# Patient Record
Sex: FEMALE | Race: WHITE | NOT HISPANIC OR LATINO | Employment: FULL TIME | URBAN - METROPOLITAN AREA
[De-identification: names, ages, dates, MRNs, and addresses within clinical notes are randomized per-mention and may not be internally consistent; named-entity substitution may affect disease eponyms.]

---

## 2022-08-22 NOTE — H&P (VIEW-ONLY)
"  Subjective:     Chief Complaint: Lung Mass Clinic (Lung nodule)      HPI: Cynthia Hanson is a 61 y.o. female who presents to clinic for evaluation of right upper lobe lung mass.      Her right upper lobe lung lesion was initially discovered on lung cancer screening CT dated 08/12/2022.  On the radiologist's interpretation, there was comment regarding these findings being new from last CT chest for lung cancer screening in 2019.  However, these images are not available for review at the time of appointment.     She states that she is a current smoker, current weight 2-3 cigarettes per day.  She was a former heavier smoker, around 1 pack per day, but has not smoked 1 pack per day for approximately 30 years.      Past Medical History:   Diagnosis Date    Cholelithiases     Macular degeneration          14 point ROS reviewed and negative unless documented in the history of present illness.       Social History     Socioeconomic History    Marital status:    Tobacco Use    Smoking status: Current Every Day Smoker     Packs/day: 1.00     Years: 35.00     Pack years: 35.00     Types: Cigarettes    Smokeless tobacco: Never Used    Tobacco comment: Now smoking 1-3 cigarettes per day         Current Outpatient Medications   Medication Instructions    loratadine (CLARITIN) 10 mg, Oral, Daily    omeprazole (PRILOSEC) 20 mg, Oral, Daily         No flowsheet data found.      Objective:   /74   Pulse 80   Temp 98.3 °F (36.8 °C)   Resp 16   Ht 5' 5" (1.651 m)   Wt 59 kg (130 lb)   SpO2 97% Comment: on room air at rest  BMI 21.63 kg/m²     Gen- A/O, NAD  CV- RRR, no murmurs  Resp- CTAB, reduced air entry bilaterally  MSK- WWP, no LE edema  Neuro- A/Ox3, no gross deficits  Psych- appropriate mood and affect        Imaging:  CT chest 08/12/2022 personally reviewed:  Right upper lobe mass measuring approximately 4 x 1.9 cm with pleural tethering and focal areas of bronchiectasis " within      Pulmonary Function Testing:  No pulmonary function testing available for review at time of appointment.        Assessment/Plan:   #Right upper lobe lung mass  #Tobacco abuse     -Ms. Hanson certainly does have risk factors for the development of lung cancer, particularly given her age and tobacco abuse, and the size of her right upper lobe mass is concerning  -however, there are features of her lesion that are suspicious for a non malignant cause, particularly parenchymal scarring--> there are pleural tethers and associated focal areas of bronchiectasis which I suspect may be traction related  -however, given the high risk for malignancy and large size of the lesion, this would benefit from sampling for attempted diagnosis   -will have navigational bronchoscopy performed this week   -should samples return negative, would favor performing PET/CT to document no FDG uptake prior to settling on a management option of surveillance       Return to clinic pending biopsy results        Brett Rosario MD

## 2022-08-23 ENCOUNTER — HOSPITAL ENCOUNTER (OUTPATIENT)
Dept: RADIOLOGY | Facility: HOSPITAL | Age: 62
Discharge: HOME | End: 2022-08-23
Attending: INTERNAL MEDICINE
Payer: COMMERCIAL

## 2022-08-23 DIAGNOSIS — R91.8 LUNG MASS: ICD-10-CM

## 2022-08-23 PROCEDURE — 71250 CT THORAX DX C-: CPT | Mod: TC

## 2022-08-24 ENCOUNTER — ANESTHESIA EVENT (OUTPATIENT)
Dept: ENDOSCOPY | Facility: HOSPITAL | Age: 62
End: 2022-08-24
Payer: COMMERCIAL

## 2022-08-25 ENCOUNTER — HOSPITAL ENCOUNTER (OUTPATIENT)
Facility: HOSPITAL | Age: 62
Discharge: HOME | End: 2022-08-25
Attending: INTERNAL MEDICINE | Admitting: INTERNAL MEDICINE
Payer: COMMERCIAL

## 2022-08-25 ENCOUNTER — ANESTHESIA (OUTPATIENT)
Dept: ENDOSCOPY | Facility: HOSPITAL | Age: 62
End: 2022-08-25
Payer: COMMERCIAL

## 2022-08-25 DIAGNOSIS — R91.1 PULMONARY NODULE: ICD-10-CM

## 2022-08-25 DIAGNOSIS — R91.1 COIN LESION: ICD-10-CM

## 2022-08-25 DIAGNOSIS — R91.8 PULMONARY MASS: Primary | ICD-10-CM

## 2022-08-25 LAB
KOH PREP SPEC: NORMAL

## 2022-08-25 PROCEDURE — 31627 NAVIGATIONAL BRONCHOSCOPY: CPT | Performed by: INTERNAL MEDICINE

## 2022-08-25 PROCEDURE — 87220 TISSUE EXAM FOR FUNGI: CPT | Mod: 91 | Performed by: INTERNAL MEDICINE

## 2022-08-25 PROCEDURE — C1887 CATHETER, GUIDING: HCPCS | Performed by: INTERNAL MEDICINE

## 2022-08-25 PROCEDURE — 99900035 HC TECH TIME PER 15 MIN (STAT)

## 2022-08-25 PROCEDURE — 37000008 HC ANESTHESIA 1ST 15 MINUTES: Performed by: INTERNAL MEDICINE

## 2022-08-25 PROCEDURE — 31623 DX BRONCHOSCOPE/BRUSH: CPT | Performed by: INTERNAL MEDICINE

## 2022-08-25 PROCEDURE — 25000003 PHARM REV CODE 250: Performed by: INTERNAL MEDICINE

## 2022-08-25 PROCEDURE — 87102 FUNGUS ISOLATION CULTURE: CPT | Mod: 91 | Performed by: INTERNAL MEDICINE

## 2022-08-25 PROCEDURE — 31628 BRONCHOSCOPY/LUNG BX EACH: CPT | Performed by: INTERNAL MEDICINE

## 2022-08-25 PROCEDURE — 27201423 OPTIME MED/SURG SUP & DEVICES STERILE SUPPLY: Performed by: INTERNAL MEDICINE

## 2022-08-25 PROCEDURE — 25000003 PHARM REV CODE 250: Performed by: STUDENT IN AN ORGANIZED HEALTH CARE EDUCATION/TRAINING PROGRAM

## 2022-08-25 PROCEDURE — 87206 SMEAR FLUORESCENT/ACID STAI: CPT | Mod: 91 | Performed by: INTERNAL MEDICINE

## 2022-08-25 PROCEDURE — 37000009 HC ANESTHESIA EA ADD 15 MINS: Performed by: INTERNAL MEDICINE

## 2022-08-25 PROCEDURE — 94002 VENT MGMT INPAT INIT DAY: CPT

## 2022-08-25 PROCEDURE — 63600175 PHARM REV CODE 636 W HCPCS: Performed by: STUDENT IN AN ORGANIZED HEALTH CARE EDUCATION/TRAINING PROGRAM

## 2022-08-25 PROCEDURE — 31629 BRONCHOSCOPY/NEEDLE BX EACH: CPT | Performed by: INTERNAL MEDICINE

## 2022-08-25 RX ORDER — METOCLOPRAMIDE HYDROCHLORIDE 5 MG/ML
10 INJECTION INTRAMUSCULAR; INTRAVENOUS EVERY 10 MIN PRN
Status: CANCELLED | OUTPATIENT
Start: 2022-08-25

## 2022-08-25 RX ORDER — DIPHENHYDRAMINE HYDROCHLORIDE 50 MG/ML
25 INJECTION INTRAMUSCULAR; INTRAVENOUS EVERY 6 HOURS PRN
Status: CANCELLED | OUTPATIENT
Start: 2022-08-25

## 2022-08-25 RX ORDER — ONDANSETRON 2 MG/ML
4 INJECTION INTRAMUSCULAR; INTRAVENOUS DAILY PRN
Status: CANCELLED | OUTPATIENT
Start: 2022-08-25

## 2022-08-25 RX ORDER — ROCURONIUM BROMIDE 10 MG/ML
INJECTION, SOLUTION INTRAVENOUS
Status: DISCONTINUED | OUTPATIENT
Start: 2022-08-25 | End: 2022-08-25

## 2022-08-25 RX ORDER — HYDROMORPHONE HYDROCHLORIDE 2 MG/ML
0.4 INJECTION, SOLUTION INTRAMUSCULAR; INTRAVENOUS; SUBCUTANEOUS EVERY 10 MIN PRN
Status: CANCELLED | OUTPATIENT
Start: 2022-08-25

## 2022-08-25 RX ORDER — MEPERIDINE HYDROCHLORIDE 25 MG/ML
12.5 INJECTION INTRAMUSCULAR; INTRAVENOUS; SUBCUTANEOUS EVERY 10 MIN PRN
Status: CANCELLED | OUTPATIENT
Start: 2022-08-25 | End: 2022-08-25

## 2022-08-25 RX ORDER — LIDOCAINE HYDROCHLORIDE 20 MG/ML
15 SOLUTION OROPHARYNGEAL ONCE
Status: COMPLETED | OUTPATIENT
Start: 2022-08-25 | End: 2022-08-25

## 2022-08-25 RX ORDER — LIDOCAINE HYDROCHLORIDE 40 MG/ML
4 SOLUTION TOPICAL
Status: DISCONTINUED | OUTPATIENT
Start: 2022-08-25 | End: 2022-08-25 | Stop reason: HOSPADM

## 2022-08-25 RX ORDER — PROPOFOL 10 MG/ML
VIAL (ML) INTRAVENOUS
Status: DISCONTINUED | OUTPATIENT
Start: 2022-08-25 | End: 2022-08-25

## 2022-08-25 RX ORDER — ONDANSETRON HYDROCHLORIDE 2 MG/ML
INJECTION, SOLUTION INTRAMUSCULAR; INTRAVENOUS
Status: DISCONTINUED | OUTPATIENT
Start: 2022-08-25 | End: 2022-08-25

## 2022-08-25 RX ORDER — DEXAMETHASONE SODIUM PHOSPHATE 4 MG/ML
INJECTION, SOLUTION INTRA-ARTICULAR; INTRALESIONAL; INTRAMUSCULAR; INTRAVENOUS; SOFT TISSUE
Status: DISCONTINUED | OUTPATIENT
Start: 2022-08-25 | End: 2022-08-25

## 2022-08-25 RX ORDER — PHENYLEPHRINE HCL IN 0.9% NACL 1 MG/10 ML
SYRINGE (ML) INTRAVENOUS
Status: DISCONTINUED | OUTPATIENT
Start: 2022-08-25 | End: 2022-08-25

## 2022-08-25 RX ORDER — PROPOFOL 10 MG/ML
VIAL (ML) INTRAVENOUS CONTINUOUS PRN
Status: DISCONTINUED | OUTPATIENT
Start: 2022-08-25 | End: 2022-08-25

## 2022-08-25 RX ORDER — FENTANYL CITRATE 50 UG/ML
INJECTION, SOLUTION INTRAMUSCULAR; INTRAVENOUS
Status: DISCONTINUED | OUTPATIENT
Start: 2022-08-25 | End: 2022-08-25

## 2022-08-25 RX ORDER — MIDAZOLAM HYDROCHLORIDE 1 MG/ML
INJECTION INTRAMUSCULAR; INTRAVENOUS
Status: DISCONTINUED | OUTPATIENT
Start: 2022-08-25 | End: 2022-08-25

## 2022-08-25 RX ADMIN — PROPOFOL 150 MG: 10 INJECTION, EMULSION INTRAVENOUS at 07:08

## 2022-08-25 RX ADMIN — SUGAMMADEX 100 MG: 100 INJECTION, SOLUTION INTRAVENOUS at 08:08

## 2022-08-25 RX ADMIN — LIDOCAINE HYDROCHLORIDE 15 ML: 20 SOLUTION ORAL; TOPICAL at 07:08

## 2022-08-25 RX ADMIN — FENTANYL CITRATE 50 MCG: 50 INJECTION, SOLUTION INTRAMUSCULAR; INTRAVENOUS at 07:08

## 2022-08-25 RX ADMIN — DEXAMETHASONE SODIUM PHOSPHATE 4 MG: 4 INJECTION, SOLUTION INTRA-ARTICULAR; INTRALESIONAL; INTRAMUSCULAR; INTRAVENOUS; SOFT TISSUE at 07:08

## 2022-08-25 RX ADMIN — LIDOCAINE HYDROCHLORIDE 4 ML: 40 SOLUTION TOPICAL at 07:08

## 2022-08-25 RX ADMIN — Medication 100 MCG: at 08:08

## 2022-08-25 RX ADMIN — ONDANSETRON 4 MG: 2 INJECTION INTRAMUSCULAR; INTRAVENOUS at 07:08

## 2022-08-25 RX ADMIN — MIDAZOLAM HYDROCHLORIDE 2 MG: 1 INJECTION, SOLUTION INTRAMUSCULAR; INTRAVENOUS at 07:08

## 2022-08-25 RX ADMIN — SODIUM CHLORIDE, SODIUM GLUCONATE, SODIUM ACETATE, POTASSIUM CHLORIDE AND MAGNESIUM CHLORIDE: 526; 502; 368; 37; 30 INJECTION, SOLUTION INTRAVENOUS at 07:08

## 2022-08-25 RX ADMIN — PROPOFOL 125 MCG/KG/MIN: 10 INJECTION, EMULSION INTRAVENOUS at 08:08

## 2022-08-25 RX ADMIN — ROCURONIUM BROMIDE 50 MG: 10 SOLUTION INTRAVENOUS at 07:08

## 2022-08-25 RX ADMIN — FENTANYL CITRATE 50 MCG: 50 INJECTION, SOLUTION INTRAMUSCULAR; INTRAVENOUS at 08:08

## 2022-08-25 NOTE — PROCEDURES
[facility]  Bronchoscopy   Procedure Note    SUMMARY     Date of Procedure: 8/25/2022    Procedure:  Electromagnetic navigational bronchoscopy    Performed by: Brett Rosario MD    Pre-Procedure Diagnosis:  Pulmonary mass    Post-Procedure Diagnosis:  Same    Anesthesia:  General endotracheal        Description of the Findings of the Procedure:     Patient was consented for the procedure with all risk and benefit of the procedure explained in detail.  Patient was given the opportunity to ask questions and all concerns were answered.  The bronchocope was inserted into the main airway via a size 8.5 endotracheal tube. An anatomical survey was done of the main airways to the level of the subsegmental bronchus with no endobronchial lesions or mucosal abnormalities identified.     The locatable guide was then inserted and electromagnetic navigation was used to locate the lesion within the right upper lobe.  Location was again confirmed with the use of continuous fluoroscopy with sweep.  Sampling of the lesion was then accomplished with 4 passes for fine-needle aspiration, 4 passes for bronchial brushing, and 8 passes of transbronchial biopsy.  All of the samples were taken with the use of continuous fluoroscopy for confirmation of location.  There was no evidence of the development of pneumothorax during the procedure.  Patient tolerated procedure well with minimal visible blood loss.  Patient extubated in the procedure room and will be sent to postanesthesia care for recovery.      Complications: None; patient tolerated the procedure well.    Estimated Blood Loss (EBL): Minimal           Specimens:   Right upper lobe fine-needle aspirate   Right upper lobe bronchial brushing  Right upper lobe transbronchial biopsy       Condition: Good        Disposition:  To PACU in stable condition.  Discharge to home following recovery.        Brett Rosario MD  [facility]

## 2022-08-25 NOTE — ANESTHESIA PROCEDURE NOTES
Intubation    Date/Time: 8/25/2022 8:00 AM  Performed by: Lisa Verduzco  Authorized by: Mohamud Duncan MD     Intubation:     Induction:  Intravenous    Intubated:  Postinduction    Mask Ventilation:  Easy mask    Attempts:  1    Attempted By:  CRNA    Method of Intubation:  Direct    Blade:  Cunningham 2    Laryngeal View Grade: Grade IIA - cords partially seen      Difficult Airway Encountered?: No      Complications:  None    Airway Device:  Oral endotracheal tube    Airway Device Size:  8.5    Style/Cuff Inflation:  Cuffed (inflated to minimal occlusive pressure)    Inflation Amount (mL):  4    Tube secured:  21    Secured at:  The lips    Placement Verified By:  Capnometry    Complicating Factors:  None    Findings Post-Intubation:  BS equal bilateral

## 2022-08-25 NOTE — DISCHARGE INSTRUCTIONS
NO driving and NO alcohol consumption for 24 hours and while taking narcotic pain medication.     Mild cough and/or sore throat is expected. Red streaks may be present in mucus. Hard candies, peppermints, throat lozenges, gargling with warm water and salt may help     Although no incisions were made monitor for signs of infection such as fever or chills.      Report to your nearest ER and/or call your doctor if you experience any SUDDEN/SEVERE chest/abdominal pain, weakness, trouble breathing, uncontrolled pain, coughing up more than 8oz of blood.

## 2022-08-25 NOTE — INTERVAL H&P NOTE
The patient has been examined and the H&P has been reviewed:    I concur with the findings and no changes have occurred since H&P was written.    Surgery risks, benefits and alternative options discussed and understood by patient/family.      Electromagnetic navigation bronchoscopy today.           There are no hospital problems to display for this patient.

## 2022-08-25 NOTE — ANESTHESIA PREPROCEDURE EVALUATION
08/25/2022  Cynthia Hanson is a 61 y.o., female.      Pre-op Assessment    I have reviewed the Patient Summary Reports.     I have reviewed the Nursing Notes. I have reviewed the NPO Status.   I have reviewed the Medications.     Review of Systems  Anesthesia Hx:   Denies Personal Hx of Anesthesia complications.   Social:  Smoker    Hematology/Oncology:  Hematology Normal      Oncology Comments: Upper lobe lesion    EENT/Dental:EENT/Dental Normal   Cardiovascular:  Cardiovascular Normal     Hepatic/GI:   GERD    Musculoskeletal:  Musculoskeletal Normal    Neurological:  Neurology Normal    Endocrine:  Endocrine Normal    Psych:  Psychiatric Normal           Physical Exam  General: Well nourished, Cooperative and Alert    Airway:  Mallampati: II   Mouth Opening: Normal  TM Distance: 4 - 6 cm  Tongue: Normal  Neck ROM: Normal ROM    Dental:  Dentures    Chest/Lungs:  Normal Respiratory Rate    Heart:  Rhythm: Regular Rhythm        Anesthesia Plan  Type of Anesthesia, risks & benefits discussed:    Anesthesia Type: Gen ETT  Intra-op Monitoring Plan: Standard ASA Monitors  Induction:  IV  Airway Plan: , Post-Induction  Informed Consent: Informed consent signed with the Patient and all parties understand the risks and agree with anesthesia plan.  All questions answered.   ASA Score: 2  Day of Surgery Review of History & Physical: H&P Update referred to the surgeon/provider.    Ready For Surgery From Anesthesia Perspective.     .

## 2022-08-25 NOTE — TRANSFER OF CARE
"Anesthesia Transfer of Care Note    Patient: Cynthia Hanson    Procedure(s) Performed: Procedure(s) (LRB):  BRONCHOSCOPY, NAVIGATIONAL (N/A)    Patient location: PACU    Anesthesia Type: general    Transport from OR: Transported from OR on room air with adequate spontaneous ventilation    Post pain: adequate analgesia    Post assessment: no apparent anesthetic complications and tolerated procedure well    Post vital signs: stable    Level of consciousness: awake, alert and oriented    Nausea/Vomiting: no nausea/vomiting    Complications: none    Transfer of care protocol was followed      Last vitals:   Visit Vitals  BP (!) 99/58   Pulse 69   Temp 36 °C (96.8 °F)   Resp 16   Ht 5' 5" (1.651 m)   Wt 59.6 kg (131 lb 6.3 oz)   SpO2 99%   Breastfeeding No   BMI 21.87 kg/m²     "

## 2022-08-26 LAB
M AVIUM PARATB TISS QL ZN STN: NORMAL
RHODAMINE-AURAMINE STN SPEC: NORMAL
RHODAMINE-AURAMINE STN SPEC: NORMAL

## 2022-08-26 NOTE — ANESTHESIA POSTPROCEDURE EVALUATION
Anesthesia Post Evaluation    Patient: Cynthia Hanson    Procedure(s) Performed: Procedure(s) (LRB):  BRONCHOSCOPY, NAVIGATIONAL (N/A)  BRONCHOSCOPY, W TRANSBRONCHIAL NEEDLE BIOPSY THROUGH TRACHEA, MAINSTEM BRONCHUS, LOBAR BRONCHUS, OR ANY COMBINATION, W FLUORO GUID IF INDIC  BRONCHOSCOPY, WITH TRANSBRONCHIAL BIOPSY  BRONCHOSCOPY, WITH BRUSH BIOPSY    Final Anesthesia Type: general      Patient location during evaluation: PACU  Patient participation: Yes- Able to Participate  Level of consciousness: awake and alert  Post-procedure vital signs: reviewed and stable  Pain management: adequate  Airway patency: patent    PONV status at discharge: No PONV  Anesthetic complications: no      Respiratory status: unassisted  Hydration status: euvolemic  Follow-up not needed.          Vitals Value Taken Time   /68 08/25/22 1027   Temp 36 °C (96.8 °F) 08/25/22 0921   Pulse 72 08/25/22 1028   Resp 15 08/25/22 0940   SpO2 92 % 08/25/22 1028         Event Time   Out of Recovery 09:40:00         Pain/Rei Score: Rei Score: 10 (8/25/2022  9:44 AM)

## 2022-08-29 VITALS
DIASTOLIC BLOOD PRESSURE: 68 MMHG | OXYGEN SATURATION: 92 % | WEIGHT: 131.38 LBS | RESPIRATION RATE: 15 BRPM | HEART RATE: 72 BPM | BODY MASS INDEX: 21.89 KG/M2 | TEMPERATURE: 97 F | SYSTOLIC BLOOD PRESSURE: 101 MMHG | HEIGHT: 65 IN

## 2022-08-29 LAB
DHEA SERPL-MCNC: NORMAL
ESTROGEN SERPL-MCNC: NORMAL PG/ML
INSULIN SERPL-ACNC: NORMAL U[IU]/ML
LAB AP CLINICAL INFORMATION: NORMAL
LAB AP GROSS DESCRIPTION: NORMAL
LAB AP INTRA OP: NORMAL
LAB AP REPORT FOOTNOTES: NORMAL
T3RU NFR SERPL: NORMAL %

## 2022-09-15 ENCOUNTER — HOSPITAL ENCOUNTER (OUTPATIENT)
Dept: RADIOLOGY | Facility: HOSPITAL | Age: 62
Discharge: HOME | End: 2022-09-15
Attending: INTERNAL MEDICINE
Payer: COMMERCIAL

## 2022-09-15 DIAGNOSIS — R91.8 PULMONARY MASS: ICD-10-CM

## 2022-09-15 DIAGNOSIS — R91.1 LUNG NODULE: ICD-10-CM

## 2022-09-15 PROCEDURE — 71045 X-RAY EXAM CHEST 1 VIEW: CPT | Mod: TC

## 2022-09-15 PROCEDURE — 78815 PET IMAGE W/CT SKULL-THIGH: CPT | Mod: TC

## 2022-09-26 LAB
FUNGUS SPEC CULT: NORMAL
FUNGUS SPEC CULT: NORMAL

## 2022-10-07 ENCOUNTER — HOSPITAL ENCOUNTER (OUTPATIENT)
Dept: RADIOLOGY | Facility: HOSPITAL | Age: 62
Discharge: HOME | DRG: 200 | End: 2022-10-07
Attending: INTERNAL MEDICINE
Payer: COMMERCIAL

## 2022-10-07 ENCOUNTER — HOSPITAL ENCOUNTER (OUTPATIENT)
Dept: RADIOLOGY | Facility: HOSPITAL | Age: 62
Discharge: HOME | DRG: 200 | End: 2022-10-07
Attending: RADIOLOGY
Payer: COMMERCIAL

## 2022-10-07 ENCOUNTER — HOSPITAL ENCOUNTER (INPATIENT)
Dept: RADIOLOGY | Facility: HOSPITAL | Age: 62
LOS: 3 days | Discharge: HOME | DRG: 200 | End: 2022-10-10
Attending: INTERNAL MEDICINE | Admitting: RADIOLOGY
Payer: COMMERCIAL

## 2022-10-07 VITALS
DIASTOLIC BLOOD PRESSURE: 60 MMHG | HEART RATE: 67 BPM | OXYGEN SATURATION: 99 % | RESPIRATION RATE: 11 BRPM | SYSTOLIC BLOOD PRESSURE: 114 MMHG

## 2022-10-07 DIAGNOSIS — J93.9 PNEUMOTHORAX, UNSPECIFIED TYPE: ICD-10-CM

## 2022-10-07 DIAGNOSIS — R91.1 LUNG NODULE: ICD-10-CM

## 2022-10-07 DIAGNOSIS — R91.8 LUNG MASS: Primary | ICD-10-CM

## 2022-10-07 LAB
ALBUMIN SERPL-MCNC: 3.6 GM/DL (ref 3.4–4.8)
ALBUMIN/GLOB SERPL: 1.3 RATIO (ref 1.1–2)
ALP SERPL-CCNC: 52 UNIT/L (ref 40–150)
ALT SERPL-CCNC: 14 UNIT/L (ref 0–55)
AST SERPL-CCNC: 16 UNIT/L (ref 5–34)
BASOPHILS # BLD AUTO: 0.07 X10(3)/MCL (ref 0–0.2)
BASOPHILS NFR BLD AUTO: 0.9 %
BILIRUBIN DIRECT+TOT PNL SERPL-MCNC: 0.5 MG/DL
BUN SERPL-MCNC: 13.4 MG/DL (ref 9.8–20.1)
CALCIUM SERPL-MCNC: 8.6 MG/DL (ref 8.4–10.2)
CHLORIDE SERPL-SCNC: 105 MMOL/L (ref 98–107)
CO2 SERPL-SCNC: 28 MMOL/L (ref 23–31)
CREAT SERPL-MCNC: 0.65 MG/DL (ref 0.55–1.02)
EOSINOPHIL # BLD AUTO: 0.2 X10(3)/MCL (ref 0–0.9)
EOSINOPHIL NFR BLD AUTO: 2.6 %
ERYTHROCYTE [DISTWIDTH] IN BLOOD BY AUTOMATED COUNT: 12.9 % (ref 11.5–17)
GFR SERPLBLD CREATININE-BSD FMLA CKD-EPI: >60 MLS/MIN/1.73/M2
GLOBULIN SER-MCNC: 2.7 GM/DL (ref 2.4–3.5)
GLUCOSE SERPL-MCNC: 116 MG/DL (ref 82–115)
HCT VFR BLD AUTO: 43.4 % (ref 37–47)
HGB BLD-MCNC: 14.4 GM/DL (ref 12–16)
IMM GRANULOCYTES # BLD AUTO: 0.02 X10(3)/MCL (ref 0–0.04)
IMM GRANULOCYTES NFR BLD AUTO: 0.3 %
INR BLD: 0.89 (ref 0–1.3)
LYMPHOCYTES # BLD AUTO: 1.7 X10(3)/MCL (ref 0.6–4.6)
LYMPHOCYTES NFR BLD AUTO: 22.3 %
MCH RBC QN AUTO: 30.8 PG (ref 27–31)
MCHC RBC AUTO-ENTMCNC: 33.2 MG/DL (ref 33–36)
MCV RBC AUTO: 92.7 FL (ref 80–94)
MONOCYTES # BLD AUTO: 0.7 X10(3)/MCL (ref 0.1–1.3)
MONOCYTES NFR BLD AUTO: 9.2 %
NEUTROPHILS # BLD AUTO: 4.9 X10(3)/MCL (ref 2.1–9.2)
NEUTROPHILS NFR BLD AUTO: 64.7 %
NRBC BLD AUTO-RTO: 0 %
PLATELET # BLD AUTO: 242 X10(3)/MCL (ref 130–400)
PMV BLD AUTO: 8.7 FL (ref 7.4–10.4)
POTASSIUM SERPL-SCNC: 3.9 MMOL/L (ref 3.5–5.1)
PROT SERPL-MCNC: 6.3 GM/DL (ref 5.8–7.6)
PROTHROMBIN TIME: 12 SECONDS (ref 12.5–14.5)
RBC # BLD AUTO: 4.68 X10(6)/MCL (ref 4.2–5.4)
SODIUM SERPL-SCNC: 139 MMOL/L (ref 136–145)
WBC # SPEC AUTO: 7.6 X10(3)/MCL (ref 4.5–11.5)

## 2022-10-07 PROCEDURE — 99152 MOD SED SAME PHYS/QHP 5/>YRS: CPT | Performed by: RADIOLOGY

## 2022-10-07 PROCEDURE — 75989 ABSCESS DRAINAGE UNDER X-RAY: CPT | Mod: TC

## 2022-10-07 PROCEDURE — 63600175 PHARM REV CODE 636 W HCPCS: Performed by: NURSE PRACTITIONER

## 2022-10-07 PROCEDURE — 25000003 PHARM REV CODE 250: Performed by: NURSE PRACTITIONER

## 2022-10-07 PROCEDURE — 36415 COLL VENOUS BLD VENIPUNCTURE: CPT | Performed by: RADIOLOGY

## 2022-10-07 PROCEDURE — 63600175 PHARM REV CODE 636 W HCPCS: Performed by: RADIOLOGY

## 2022-10-07 PROCEDURE — 71045 X-RAY EXAM CHEST 1 VIEW: CPT | Mod: TC

## 2022-10-07 PROCEDURE — 32408 CORE NDL BX LNG/MED PERQ: CPT

## 2022-10-07 PROCEDURE — 25000003 PHARM REV CODE 250: Performed by: RADIOLOGY

## 2022-10-07 PROCEDURE — 80053 COMPREHEN METABOLIC PANEL: CPT | Performed by: NURSE PRACTITIONER

## 2022-10-07 PROCEDURE — 11000001 HC ACUTE MED/SURG PRIVATE ROOM

## 2022-10-07 PROCEDURE — 36415 COLL VENOUS BLD VENIPUNCTURE: CPT | Performed by: NURSE PRACTITIONER

## 2022-10-07 PROCEDURE — 85610 PROTHROMBIN TIME: CPT | Performed by: RADIOLOGY

## 2022-10-07 PROCEDURE — 85025 COMPLETE CBC W/AUTO DIFF WBC: CPT | Performed by: RADIOLOGY

## 2022-10-07 RX ORDER — KETOROLAC TROMETHAMINE 30 MG/ML
30 INJECTION, SOLUTION INTRAMUSCULAR; INTRAVENOUS ONCE
Status: COMPLETED | OUTPATIENT
Start: 2022-10-07 | End: 2022-10-07

## 2022-10-07 RX ORDER — IBUPROFEN 200 MG
24 TABLET ORAL
Status: DISCONTINUED | OUTPATIENT
Start: 2022-10-07 | End: 2022-10-10 | Stop reason: HOSPADM

## 2022-10-07 RX ORDER — MIDAZOLAM HYDROCHLORIDE 1 MG/ML
INJECTION INTRAMUSCULAR; INTRAVENOUS
Status: COMPLETED | OUTPATIENT
Start: 2022-10-07 | End: 2022-10-07

## 2022-10-07 RX ORDER — MIDAZOLAM HYDROCHLORIDE 1 MG/ML
INJECTION INTRAMUSCULAR; INTRAVENOUS
Status: DISCONTINUED
Start: 2022-10-07 | End: 2022-10-08 | Stop reason: HOSPADM

## 2022-10-07 RX ORDER — LIDOCAINE HYDROCHLORIDE 20 MG/ML
INJECTION, SOLUTION EPIDURAL; INFILTRATION; INTRACAUDAL; PERINEURAL
Status: DISPENSED
Start: 2022-10-07 | End: 2022-10-07

## 2022-10-07 RX ORDER — FENTANYL CITRATE 50 UG/ML
INJECTION, SOLUTION INTRAMUSCULAR; INTRAVENOUS
Status: COMPLETED | OUTPATIENT
Start: 2022-10-07 | End: 2022-10-07

## 2022-10-07 RX ORDER — FENTANYL CITRATE 50 UG/ML
INJECTION, SOLUTION INTRAMUSCULAR; INTRAVENOUS
Status: DISCONTINUED
Start: 2022-10-07 | End: 2022-10-08 | Stop reason: HOSPADM

## 2022-10-07 RX ORDER — MIDAZOLAM HYDROCHLORIDE 1 MG/ML
INJECTION INTRAMUSCULAR; INTRAVENOUS
Status: DISPENSED
Start: 2022-10-07 | End: 2022-10-07

## 2022-10-07 RX ORDER — ACETAMINOPHEN 325 MG/1
650 TABLET ORAL EVERY 8 HOURS PRN
Status: DISCONTINUED | OUTPATIENT
Start: 2022-10-07 | End: 2022-10-10 | Stop reason: HOSPADM

## 2022-10-07 RX ORDER — LIDOCAINE HYDROCHLORIDE 20 MG/ML
INJECTION, SOLUTION INFILTRATION; PERINEURAL
Status: COMPLETED | OUTPATIENT
Start: 2022-10-07 | End: 2022-10-07

## 2022-10-07 RX ORDER — HYDROCODONE BITARTRATE AND ACETAMINOPHEN 5; 325 MG/1; MG/1
1 TABLET ORAL EVERY 4 HOURS PRN
Status: DISCONTINUED | OUTPATIENT
Start: 2022-10-07 | End: 2022-10-10 | Stop reason: HOSPADM

## 2022-10-07 RX ORDER — ONDANSETRON 2 MG/ML
4 INJECTION INTRAMUSCULAR; INTRAVENOUS EVERY 8 HOURS PRN
Status: DISCONTINUED | OUTPATIENT
Start: 2022-10-07 | End: 2022-10-10 | Stop reason: HOSPADM

## 2022-10-07 RX ORDER — IBUPROFEN 200 MG
16 TABLET ORAL
Status: DISCONTINUED | OUTPATIENT
Start: 2022-10-07 | End: 2022-10-10 | Stop reason: HOSPADM

## 2022-10-07 RX ORDER — ACETAMINOPHEN 325 MG/1
650 TABLET ORAL EVERY 4 HOURS PRN
Status: DISCONTINUED | OUTPATIENT
Start: 2022-10-07 | End: 2022-10-10 | Stop reason: HOSPADM

## 2022-10-07 RX ORDER — FENTANYL CITRATE 50 UG/ML
INJECTION, SOLUTION INTRAMUSCULAR; INTRAVENOUS
Status: DISPENSED
Start: 2022-10-07 | End: 2022-10-07

## 2022-10-07 RX ORDER — LIDOCAINE HYDROCHLORIDE 20 MG/ML
INJECTION, SOLUTION EPIDURAL; INFILTRATION; INTRACAUDAL; PERINEURAL
Status: DISCONTINUED
Start: 2022-10-07 | End: 2022-10-08 | Stop reason: HOSPADM

## 2022-10-07 RX ORDER — MORPHINE SULFATE 4 MG/ML
4 INJECTION, SOLUTION INTRAMUSCULAR; INTRAVENOUS EVERY 4 HOURS PRN
Status: DISCONTINUED | OUTPATIENT
Start: 2022-10-07 | End: 2022-10-10 | Stop reason: HOSPADM

## 2022-10-07 RX ORDER — GLUCAGON 1 MG
1 KIT INJECTION
Status: DISCONTINUED | OUTPATIENT
Start: 2022-10-07 | End: 2022-10-10 | Stop reason: HOSPADM

## 2022-10-07 RX ADMIN — KETOROLAC TROMETHAMINE 30 MG: 30 INJECTION, SOLUTION INTRAMUSCULAR at 09:10

## 2022-10-07 RX ADMIN — HYDROCODONE BITARTRATE AND ACETAMINOPHEN 1 TABLET: 5; 325 TABLET ORAL at 08:10

## 2022-10-07 RX ADMIN — LIDOCAINE HYDROCHLORIDE 5 ML: 20 INJECTION, SOLUTION INFILTRATION; PERINEURAL at 02:10

## 2022-10-07 RX ADMIN — MIDAZOLAM 0.5 MG: 1 INJECTION INTRAMUSCULAR; INTRAVENOUS at 02:10

## 2022-10-07 RX ADMIN — SODIUM CHLORIDE 250 ML: 9 INJECTION, SOLUTION INTRAVENOUS at 09:10

## 2022-10-07 RX ADMIN — FENTANYL CITRATE 25 MCG: 50 INJECTION, SOLUTION INTRAMUSCULAR; INTRAVENOUS at 10:10

## 2022-10-07 RX ADMIN — FENTANYL CITRATE 50 MCG: 50 INJECTION, SOLUTION INTRAMUSCULAR; INTRAVENOUS at 02:10

## 2022-10-07 RX ADMIN — LIDOCAINE HYDROCHLORIDE 5 ML: 20 INJECTION, SOLUTION INFILTRATION; PERINEURAL at 10:10

## 2022-10-07 RX ADMIN — MIDAZOLAM 1 MG: 1 INJECTION INTRAMUSCULAR; INTRAVENOUS at 10:10

## 2022-10-07 NOTE — H&P
[facility]   H&P ADMISSION NOTE      Patient Name: Cynthia Hanson  MRN: [id number]  Patient Class: IP- Inpatient   Admission Date: 10/7/2022   Admitting Physician:  Service   Attending Physician: Jayesh Garza MD  Primary Care Provider: Silvana Maciel MD  Face-to-Face encounter date: 10/07/2022        CHIEF COMPLAINT   No chief complaint on file.      HISTORY OF PRESENTING ILLNESS   61-year-old female with no past medical history.  She was found to have a right upper lobe spiculated mass on routine screening.  She has a extensive smoking history however has cut back significantly to only a few cigarettes per day.  She was sent to outpatient pulmonary clinic and PET scan noted hypermetabolic lesion and thus initially bronchoscopy was performed however unsuccessful.  She was sent for IR guided biopsy today and during the biopsy was complicated by small pneumothorax subsequently requiring a small pigtail catheter placement.  We have been asked to admit and continue monitoring of this pneumothorax.  At this time patient denies having any complaints besides just soreness all over.  Otherwise she is relatively healthy and active at baseline.        PAST MEDICAL HISTORY     Past Medical History:   Diagnosis Date    Cholelithiases     Lung mass     Macular degeneration        PAST SURGICAL HISTORY     Past Surgical History:   Procedure Laterality Date     SECTION N/A     NAVIGATIONAL BRONCHOSCOPY N/A 2022    Procedure: BRONCHOSCOPY, NAVIGATIONAL;  Surgeon: Brett Rosario MD;  [facility]: [facility] BRONCHOSCOPY LAB;  Service: Pulmonary;  Laterality: N/A;    TUBAL LIGATION         FAMILY HISTORY   Reviewed and noncontributory to this case    SOCIAL HISTORY     Social History     Socioeconomic History    Marital status:    Tobacco Use    Smoking status: Every Day     Packs/day: 1.00     Years: 35.00     Pack years: 35.00     Types: Cigarettes     Smokeless tobacco: Never    Tobacco comments:     Now smoking 1-3 cigarettes per day   Substance and Sexual Activity    Alcohol use: Yes     Alcohol/week: 6.0 standard drinks     Types: 6 Cans of beer per week    Drug use: Not Currently    Sexual activity: Yes       HOME MEDICATIONS     Prior to Admission medications    Medication Sig Start Date End Date Taking? Authorizing Provider   loratadine (CLARITIN) 10 mg tablet Take 10 mg by mouth once daily.   Yes Historical Provider   omeprazole (PRILOSEC) 20 MG capsule Take 20 mg by mouth once daily.   Yes Historical Provider       ALLERGIES   Chlorine and Penicillins          REVIEW OF SYSTEMS   Except as documented above, all other systems reviewed and negative    PHYSICAL EXAM     Vitals:    10/07/22 1500   BP: 113/78   Pulse: 69   Resp: 16   Temp:       General:  In no acute distress, resting comfortably  Head and neck:  Atraumatic, normocephalic, moist mucous membranes, supple neck  Chest:  Slight diminished breath sounds to the right chest, right small pigtail catheter  Heart:  S1, S2, no appreciable murmur  Abdomen:  Soft, nontender, BS +  MSK:  Warm, no lower extremity edema, no clubbing or cyanosis  Neuro:  Alert and oriented x4, moving all extremities with good strength  Integumentary:  No obvious skin rash  Psychiatry:  Appropriate mood and affect          ASSESSMENT AND PLAN   Iatrogenic right-sided pneumothorax-status post small bore pigtail catheter   Suspected primary lung cancer     History of smoking       Pulmonary consulted for right pigtail catheter management.  Keep to wall suction for now.    Repeat chest x-ray in the morning.    Likely will need CV surgery evaluation for resection/lobectomy   Pending pathology for official report.  Preliminary while during biopsy was malignancy.  This was relayed to the patient and she is aware as she was expecting this already    DVT prophylaxis:     __________________________________________________________________  LABS/MICRO/MEDS/DIAGNOSTICS       LABS  No results for input(s): NA, K, CHLORIDE, CO2, BUN, CREATININE, GLUCOSE, CALCIUM, ALKPHOS, AST, ALT, ALBUMIN in the last 72 hours.  Recent Labs     10/07/22  0622   WBC 7.6   RBC 4.68   HCT 43.4   MCV 92.7          MICROBIOLOGY  Microbiology Results (last 7 days)       ** No results found for the last 168 hours. **            MEDICATIONS   fentaNYL        LIDOcaine (PF) 20 mg/mL (2%)        midazolam          INFUSIONS      DIAGNOSTIC TESTS  X-Ray Chest 1 View   Final Result      Placement of right-sided pleural catheter with decreasing size of the pneumothorax.         Electronically signed by: Eve Fonseca   Date:    10/07/2022   Time:    15:19      CT Guided Abscess Drainage With Catheter   Final Result      Successful placement of a 8 Austrian drain into the right pneumothorax.         Electronically signed by: Alen Spivey   Date:    10/07/2022   Time:    14:57      X-Ray Chest 1 View   Final Result      Right-sided pneumothorax has increased in size.         Electronically signed by: Alen Spivey   Date:    10/07/2022   Time:    13:42      X-Ray Chest 1 View   Final Result      Stable right upper lobe lung nodule and right-sided pneumothorax         Electronically signed by: Jaylene Aldana   Date:    10/07/2022   Time:    11:53      X-Ray Chest 1 View   Final Result      No visible pneumothorax.         Electronically signed by: Eve Fonseca   Date:    10/07/2022   Time:    10:55      CT Biopsy Lung w/ guidance   Final Result      CT-guided right upper lobe biopsy as described above.  Small surrounding hematoma as expected.  Small pneumothorax will be followed for possible chest tube placement.         Electronically signed by: Alen Spivey   Date:    10/07/2022   Time:    11:05             Patient information was obtained from patient, patient's family, past medical records and  ER records.   All diagnosis and differential diagnosis have been reviewed; assessment and plan has been documented. I have personally reviewed the labs and test results that are presently available; I have reviewed the patients medication list. I have reviewed the consulting providers response and recommendations. I have reviewed or attempted to review medical records based upon their availability.  All of the patient's questions have been addressed and answered. Patient's is agreeable to the above stated plan. I will continue to monitor closely and make adjustments to medical management as needed.  This document was created using M*Modal Fluency Direct.  Transcription errors may have been made.  Please contact me if any questions may rise regarding documentation to clarify verbiage.        Jayesh Garza MD   10/07/2022   Internal Medicine

## 2022-10-07 NOTE — H&P
Radiology History & Physical      SUBJECTIVE:     Chief Complaint: Right Lung Nodule    History of Present Illness:  Cynthia Hanson is a 61 y.o. female who presents for Biopsy  Past Medical History:   Diagnosis Date    Cholelithiases     Lung mass     Macular degeneration      Past Surgical History:   Procedure Laterality Date     SECTION N/A     NAVIGATIONAL BRONCHOSCOPY N/A 2022    Procedure: BRONCHOSCOPY, NAVIGATIONAL;  Surgeon: Brett oRsario MD;  Location: [facility] BRONCHOSCOPY LAB;  Service: Pulmonary;  Laterality: N/A;    TUBAL LIGATION         Home Meds:   Prior to Admission medications    Medication Sig Start Date End Date Taking? Authorizing Provider   loratadine (CLARITIN) 10 mg tablet Take 10 mg by mouth once daily.   Yes Historical Provider   omeprazole (PRILOSEC) 20 MG capsule Take 20 mg by mouth once daily.   Yes Historical Provider     Anticoagulants/Antiplatelets: no anticoagulation    Allergies:   Review of patient's allergies indicates:   Allergen Reactions    Chlorine     Penicillins Rash     Sedation History:  no adverse reactions    Review of Systems:   Hematological: no known coagulopathies  Respiratory: no shortness of breath  Cardiovascular: no chest pain  Gastrointestinal: no abdominal pain  Genito-Urinary: no dysuria  Musculoskeletal: negative  Neurological: no TIA or stroke symptoms         OBJECTIVE:     Vital Signs (Most Recent)  Temp: 98 °F (36.7 °C) (10/07/22 0632)  Pulse: 76 (10/07/22 0632)  Resp: 18 (10/07/22 0632)  BP: 102/66 (10/07/22 0632)  SpO2: 96 % (10/07/22 0632)    Physical Exam:  ASA: 2    General: no acute distress  Mental Status: alert and oriented to person, place and time  HEENT: normocephalic, atraumatic  Chest: unlabored breathing  Heart: regular heart rate  Abdomen: nondistended  Extremity: moves all extremities    Laboratory  Lab Results   Component Value Date    INR 0.89 10/07/2022       Lab Results   Component Value Date    WBC 7.6  10/07/2022    HGB 14.4 10/07/2022    HCT 43.4 10/07/2022    MCV 92.7 10/07/2022     10/07/2022    No results found for: GLU, NA, K, CL, CO2, BUN, CREATININE, CALCIUM, MG, ALT, AST, ALBUMIN, BILITOT, BILIDIR    ASSESSMENT/PLAN:     Sedation Plan: Moderate  Patient will undergo R Lung Biopsy.    Alen Spivey MD

## 2022-10-07 NOTE — DISCHARGE SUMMARY
Radiology Post-Procedure Note    Pre Op Diagnosis: Right lung mass    Post Op Diagnosis: Right lung mass    Procedure: right lung biopsy    Procedure performed by: Alen Spivey M.D.    Written Informed Consent Obtained: Yes    Specimen Removed: YES     Estimated Blood Loss: Minimal    Findings:   Standard Lung Biopsy. Small post op pneumothorax will be followed.     Patient tolerated procedure well.    Alen Spivey MD

## 2022-10-07 NOTE — OP NOTE
Radiology Post-Procedure Note    Pre Op Diagnosis: Pneumothorax    Post Op Diagnosis:  Chest tube in place    Procedure:  Chest tube placement    Procedure performed by: Alen Spivey M.D.    Written Informed Consent Obtained: Yes    Specimen Removed: YES    Estimated Blood Loss: Minimal    Findings:   Standard Chest tube placement    Patient tolerated procedure well.    Alen Spivey MD

## 2022-10-07 NOTE — SEDATION DOCUMENTATION
Dr Spivey spoke with both pt and spouse, both agree with proceeding with biopsy. Pt returned to CT room at this time.

## 2022-10-07 NOTE — SEDATION DOCUMENTATION
Post-procedure scan, small pneumothorax noted per MD. Pt placed on right side and high flow O2 @ 15L. NAD noted. VSS. Will monitor and rescan in 10 minutes.

## 2022-10-07 NOTE — SEDATION DOCUMENTATION
Dr Spivey request to speak with both pt ans spouse regarding procedure due to high risk. Unable to locate/phone/speak with spouse, [facility] able to speak with both pt and spouse.

## 2022-10-07 NOTE — H&P
Radiology History & Physical      SUBJECTIVE:     Chief Complaint: Pneumothorax    History of Present Illness:  Cynthia Hanson is a 61 y.o. female who presents for Chest tube placement  Past Medical History:   Diagnosis Date    Cholelithiases     Lung mass     Macular degeneration      Past Surgical History:   Procedure Laterality Date     SECTION N/A     NAVIGATIONAL BRONCHOSCOPY N/A 2022    Procedure: BRONCHOSCOPY, NAVIGATIONAL;  Surgeon: Brett Rosario MD;  Location: [facility] BRONCHOSCOPY LAB;  Service: Pulmonary;  Laterality: N/A;    TUBAL LIGATION         Home Meds:   Prior to Admission medications    Medication Sig Start Date End Date Taking? Authorizing Provider   loratadine (CLARITIN) 10 mg tablet Take 10 mg by mouth once daily.    Historical Provider   omeprazole (PRILOSEC) 20 MG capsule Take 20 mg by mouth once daily.    Historical Provider     Anticoagulants/Antiplatelets: no anticoagulation    Allergies:   Review of patient's allergies indicates:   Allergen Reactions    Chlorine     Penicillins Rash     Sedation History:  no adverse reactions    Review of Systems:   Hematological: no known coagulopathies  Respiratory: no shortness of breath  Cardiovascular: no chest pain  Gastrointestinal: no abdominal pain  Genito-Urinary: no dysuria  Musculoskeletal: negative  Neurological: no TIA or stroke symptoms         OBJECTIVE:     Vital Signs (Most Recent)       Physical Exam:  ASA: 2    General: no acute distress  Mental Status: alert and oriented to person, place and time  HEENT: normocephalic, atraumatic  Chest: unlabored breathing  Heart: regular heart rate  Abdomen: nondistended  Extremity: moves all extremities    Laboratory  Lab Results   Component Value Date    INR 0.89 10/07/2022       Lab Results   Component Value Date    WBC 7.6 10/07/2022    HGB 14.4 10/07/2022    HCT 43.4 10/07/2022    MCV 92.7 10/07/2022     10/07/2022    No results found for: GLU, NA, K, CL, CO2,  BUN, CREATININE, CALCIUM, MG, ALT, AST, ALBUMIN, BILITOT, BILIDIR    ASSESSMENT/PLAN:     Sedation Plan: Moderate  Patient will undergo Chest tube placement.    Alen Spivey MD

## 2022-10-08 LAB
MYCOBACTERIUM SPEC QL CULT: NORMAL
MYCOBACTERIUM SPEC QL CULT: NORMAL

## 2022-10-08 PROCEDURE — 63600175 PHARM REV CODE 636 W HCPCS: Performed by: NURSE PRACTITIONER

## 2022-10-08 PROCEDURE — 11000001 HC ACUTE MED/SURG PRIVATE ROOM

## 2022-10-08 PROCEDURE — 63600175 PHARM REV CODE 636 W HCPCS: Performed by: INTERNAL MEDICINE

## 2022-10-08 PROCEDURE — 25000003 PHARM REV CODE 250: Performed by: NURSE PRACTITIONER

## 2022-10-08 RX ORDER — ENOXAPARIN SODIUM 100 MG/ML
40 INJECTION SUBCUTANEOUS EVERY 24 HOURS
Status: DISCONTINUED | OUTPATIENT
Start: 2022-10-08 | End: 2022-10-10 | Stop reason: HOSPADM

## 2022-10-08 RX ORDER — SODIUM CHLORIDE, SODIUM LACTATE, POTASSIUM CHLORIDE, CALCIUM CHLORIDE 600; 310; 30; 20 MG/100ML; MG/100ML; MG/100ML; MG/100ML
INJECTION, SOLUTION INTRAVENOUS CONTINUOUS
Status: DISCONTINUED | OUTPATIENT
Start: 2022-10-08 | End: 2022-10-09

## 2022-10-08 RX ADMIN — HYDROCODONE BITARTRATE AND ACETAMINOPHEN 1 TABLET: 5; 325 TABLET ORAL at 05:10

## 2022-10-08 RX ADMIN — SODIUM CHLORIDE, POTASSIUM CHLORIDE, SODIUM LACTATE AND CALCIUM CHLORIDE: 600; 310; 30; 20 INJECTION, SOLUTION INTRAVENOUS at 09:10

## 2022-10-08 RX ADMIN — SODIUM CHLORIDE, POTASSIUM CHLORIDE, SODIUM LACTATE AND CALCIUM CHLORIDE: 600; 310; 30; 20 INJECTION, SOLUTION INTRAVENOUS at 06:10

## 2022-10-08 RX ADMIN — HYDROCODONE BITARTRATE AND ACETAMINOPHEN 1 TABLET: 5; 325 TABLET ORAL at 11:10

## 2022-10-08 RX ADMIN — HYDROCODONE BITARTRATE AND ACETAMINOPHEN 1 TABLET: 5; 325 TABLET ORAL at 10:10

## 2022-10-08 RX ADMIN — ONDANSETRON 4 MG: 2 INJECTION INTRAMUSCULAR; INTRAVENOUS at 07:10

## 2022-10-08 RX ADMIN — HYDROCODONE BITARTRATE AND ACETAMINOPHEN 1 TABLET: 5; 325 TABLET ORAL at 07:10

## 2022-10-08 RX ADMIN — ENOXAPARIN SODIUM 40 MG: 40 INJECTION SUBCUTANEOUS at 04:10

## 2022-10-08 NOTE — NURSING
Notified by CNA that patient's BP was in the low 80s. Upon re-assessment of BP it was 69/45 with a MAP of 51. Patient was asymtomatic:  alert and conversant. Hospitalist notified: 250ml bolus ordered and ran: patient's BP up to 90s with MAP over 65.

## 2022-10-08 NOTE — PROGRESS NOTES
[facility] MEDICINE   PROGRESS NOTE        CHIEF COMPLAINT   Hospital follow up    HOSPITAL COURSE   61-year-old female with no past medical history.  She was found to have a right upper lobe spiculated mass on routine screening.  She has a extensive smoking history however has cut back significantly to only a few cigarettes per day.  She was sent to outpatient pulmonary clinic and PET scan noted hypermetabolic lesion and thus initially bronchoscopy was performed however unsuccessful.  She was sent for IR guided biopsy today and during the biopsy was complicated by small pneumothorax subsequently requiring a small pigtail catheter placement.  We have been asked to admit and continue monitoring of this pneumothorax.  At this time patient denies having any complaints besides just soreness all over.  Otherwise she is relatively healthy and active at baseline.    Today  Seen examined this morning.  Doing well without any complaints.  Yesterday she was having episodes of hypotension requiring fluid bolus.  She still remains hypotensive this morning.        OBJECTIVE/PHYSICAL EXAM     VITAL SIGNS (MOST RECENT):  Temp: 98.1 °F (36.7 °C) (10/08/22 0733)  Pulse: 62 (10/08/22 0733)  Resp: 18 (10/08/22 0733)  BP: (!) 86/55 (10/08/22 0733)  SpO2: 98 % (10/08/22 0733)   VITAL SIGNS (24 HOUR RANGE):  Temp:  [97.7 °F (36.5 °C)-98.1 °F (36.7 °C)] 98.1 °F (36.7 °C)  Pulse:  [51-83] 62  Resp:  [11-21] 18  SpO2:  [96 %-100 %] 98 %  BP: ()/() 86/55   GENERAL: In no acute distress, afebrile  HEENT:  CHEST:  Right-sided pigtail catheter, diminished on the right  HEART: S1, S2, no appreciable murmur  ABDOMEN: Soft, nontender, BS +  MSK: Warm, no lower extremity edema, no clubbing or cyanosis  NEUROLOGIC: Alert and oriented x4, moving all extremities with good strength   INTEGUMENTARY:  PSYCHIATRY:        ASSESSMENT/PLAN   Iatrogenic right-sided pneumothorax-status post small bore pigtail  catheter   Suspected primary lung cancer     History of smoking        Pulmonary consulted for right pigtail catheter management.  Keep to wall suction for now.    Likely will need CV surgery evaluation for resection/lobectomy once we have pathology.  Can be done as outpatient.  Pending pathology for official report.  Preliminary while during biopsy was malignancy.  This was relayed to the patient and she is aware as she was expecting this already  Start IV fluids for now.  Monitor blood pressure.     DVT prophylaxis:  Lovenox 40    Anticipated discharge and disposition:   __________________________________________________________________________    LABS/MICRO/MEDS/DIAGNOSTICS       LABS  Recent Labs     10/07/22  2148      K 3.9   CHLORIDE 105   CO2 28   BUN 13.4   CREATININE 0.65   GLUCOSE 116*   CALCIUM 8.6   ALKPHOS 52   AST 16   ALT 14   ALBUMIN 3.6     Recent Labs     10/07/22  0622   WBC 7.6   RBC 4.68   HCT 43.4   MCV 92.7          MICROBIOLOGY  Microbiology Results (last 7 days)       ** No results found for the last 168 hours. **            MEDICATIONS     INFUSIONS      DIAGNOSTIC TESTS  X-Ray Chest 1 View   Final Result      Persistent right apical pneumothorax with no significant interval change.         Electronically signed by: Hannah Vick MD   Date:    10/08/2022   Time:    08:00      X-Ray Chest 1 View   Final Result      Placement of right-sided pleural catheter with decreasing size of the pneumothorax.         Electronically signed by: Eve Fonseca   Date:    10/07/2022   Time:    15:19      CT Guided Abscess Drainage With Catheter   Final Result      Successful placement of a 8 [place] drain into the right pneumothorax.         Electronically signed by: Alen Spivey   Date:    10/07/2022   Time:    14:57      X-Ray Chest 1 View   Final Result      Right-sided pneumothorax has increased in size.         Electronically signed by: Alen Spivey   Date:    10/07/2022    Time:    13:42      X-Ray Chest 1 View   Final Result      Stable right upper lobe lung nodule and right-sided pneumothorax         Electronically signed by: Jaylene Aldana   Date:    10/07/2022   Time:    11:53      X-Ray Chest 1 View   Final Result      No visible pneumothorax.         Electronically signed by: Eve Fonseca   Date:    10/07/2022   Time:    10:55      CT Biopsy Lung w/ guidance   Final Result      CT-guided right upper lobe biopsy as described above.  Small surrounding hematoma as expected.  Small pneumothorax will be followed for possible chest tube placement.         Electronically signed by: Alen Spivey   Date:    10/07/2022   Time:    11:05               All diagnosis and differential diagnosis have been reviewed; assessment and plan has been documented. I have personally reviewed the labs and test results that are presently available; I have reviewed the patients medication list. I have reviewed the consulting providers response and recommendations. I have reviewed or attempted to review medical records based upon their availability.  All of the patient's questions have been addressed and answered. Patient's is agreeable to the above stated plan. I will continue to monitor closely and make adjustments to medical management as needed.  This document was created using M*Modal Fluency Direct.  Transcription errors may have been made.  Please contact me if any questions may rise regarding documentation to clarify verbiage.        Jayesh Garza MD   10/08/2022   Internal Medicine

## 2022-10-08 NOTE — CONSULTS
[facility] - 4th Floor Medical Telemetry  Pulmonary Critical Care Note    Patient Name: Cynthia Hanson  MRN: [id number]  Admission Date: 10/7/2022  Hospital Length of Stay: 1 days  Code Status: Full Code  Attending Provider: Jayesh Garza MD  Primary Care Provider: Silvana Maciel MD     Subjective:     HPI:   This is a 61 year old female patient of Dr Rosario who was found to have a RUL lung mass on CT lung cancer screening in 2022. She underwent navigational bronchoscopy on 22 which was non diagnostic. She underwent PET which revealed the mass was hypermetabolic therefore she was referred for CT guided needle biopsy. She underwent CT guided needle biopsy on 10/7/22 by IR which was complicated by a pneumothorax requiring chest tube placement. CXR this AM with continued right apical pneumothorax. Patient doing well on room air.       Past Medical History:   Diagnosis Date    Cholelithiases     Lung mass     Macular degeneration        Past Surgical History:   Procedure Laterality Date     SECTION N/A     NAVIGATIONAL BRONCHOSCOPY N/A 2022    Procedure: BRONCHOSCOPY, NAVIGATIONAL;  Surgeon: Brett Rosario MD;  Location: [facility] BRONCHOSCOPY LAB;  Service: Pulmonary;  Laterality: N/A;    TUBAL LIGATION         Social History     Socioeconomic History    Marital status:    Tobacco Use    Smoking status: Every Day     Packs/day: 1.00     Years: 35.00     Pack years: 35.00     Types: Cigarettes    Smokeless tobacco: Never    Tobacco comments:     Now smoking 1-3 cigarettes per day   Substance and Sexual Activity    Alcohol use: Yes     Alcohol/week: 6.0 standard drinks     Types: 6 Cans of beer per week    Drug use: Not Currently    Sexual activity: Yes           Current Outpatient Medications   Medication Instructions    loratadine (CLARITIN) 10 mg, Oral, Daily    omeprazole (PRILOSEC) 20 mg, Oral, Daily             Review of Systems   All other systems reviewed  and are negative.       Objective:       Intake/Output Summary (Last 24 hours) at 10/8/2022 0827  Last data filed at 10/8/2022 0612  Gross per 24 hour   Intake 480 ml   Output --   Net 480 ml         Vital Signs (Most Recent):  Temp: 98.1 °F (36.7 °C) (10/08/22 0733)  Pulse: 62 (10/08/22 0733)  Resp: 18 (10/08/22 0733)  BP: (!) 86/55 (10/08/22 0733)  SpO2: 98 % (10/08/22 0733)    Body mass index is 22.13 kg/m².  Weight: 60.3 kg (133 lb) Vital Signs (24h Range):  Temp:  [97.7 °F (36.5 °C)-98.1 °F (36.7 °C)] 98.1 °F (36.7 °C)  Pulse:  [51-83] 62  Resp:  [11-21] 18  SpO2:  [96 %-100 %] 98 %  BP: ()/() 86/55     Physical Exam  Vitals reviewed.   Constitutional:       Appearance: Normal appearance.   HENT:      Head: Normocephalic and atraumatic.   Cardiovascular:      Rate and Rhythm: Normal rate.      Heart sounds: Normal heart sounds.   Pulmonary:      Effort: Pulmonary effort is normal.      Breath sounds: Normal breath sounds.      Comments: Right sided chest tube in place  Abdominal:      Palpations: Abdomen is soft.   Musculoskeletal:      Cervical back: Neck supple.   Neurological:      General: No focal deficit present.      Mental Status: She is alert and oriented to person, place, and time.         Lines/Drains/Airways       Drain  Duration                  Chest Tube 10/07/22 1431 Right Pleural 10 Fr. <1 day              Peripheral Intravenous Line  Duration                  Peripheral IV - Single Lumen 08/25/22 0704 44 days         Peripheral IV - Single Lumen 08/25/22 0710 18 G Left;Posterior Hand 44 days         Peripheral IV - Single Lumen 10/07/22 0620 20 G Anterior;Distal;Left Forearm 1 day                    Significant Labs:    Lab Results   Component Value Date    WBC 7.6 10/07/2022    HGB 14.4 10/07/2022    HCT 43.4 10/07/2022    MCV 92.7 10/07/2022     10/07/2022         BMP  Lab Results   Component Value Date     10/07/2022    K 3.9 10/07/2022    CO2 28 10/07/2022    BUN  13.4 10/07/2022    CREATININE 0.65 10/07/2022    CALCIUM 8.6 10/07/2022       ABG  No results for input(s): PH, PO2, PCO2, HCO3, BE in the last 168 hours.         Significant Imaging:  I have reviewed the pertinent imaging within the past 24 hours.        Assessment/Plan:     Assessment  Right upper lobe hypermetabolic lung mass post CT guided needle biopsy on 10/7 complicated by pneumothorax requiring chest tube placement      Plan  Chest xray this Am with persistent right apical pneumothorax. Will leave chest tube to suction at this time and repeat chest xray in the morning.      FELICIA Finley  Pulmonary Critical Care Medicine  [facility] - 4th Floor Medical Telemetry

## 2022-10-09 PROCEDURE — 63600175 PHARM REV CODE 636 W HCPCS: Performed by: INTERNAL MEDICINE

## 2022-10-09 PROCEDURE — 25500020 PHARM REV CODE 255: Performed by: RADIOLOGY

## 2022-10-09 PROCEDURE — 11000001 HC ACUTE MED/SURG PRIVATE ROOM

## 2022-10-09 PROCEDURE — 25000003 PHARM REV CODE 250: Performed by: NURSE PRACTITIONER

## 2022-10-09 RX ADMIN — HYDROCODONE BITARTRATE AND ACETAMINOPHEN 1 TABLET: 5; 325 TABLET ORAL at 04:10

## 2022-10-09 RX ADMIN — HYDROCODONE BITARTRATE AND ACETAMINOPHEN 1 TABLET: 5; 325 TABLET ORAL at 08:10

## 2022-10-09 RX ADMIN — HYDROCODONE BITARTRATE AND ACETAMINOPHEN 1 TABLET: 5; 325 TABLET ORAL at 09:10

## 2022-10-09 RX ADMIN — HYDROCODONE BITARTRATE AND ACETAMINOPHEN 1 TABLET: 5; 325 TABLET ORAL at 12:10

## 2022-10-09 RX ADMIN — IOPAMIDOL 100 ML: 755 INJECTION, SOLUTION INTRAVENOUS at 12:10

## 2022-10-09 RX ADMIN — ENOXAPARIN SODIUM 40 MG: 40 INJECTION SUBCUTANEOUS at 04:10

## 2022-10-09 NOTE — SIGNIFICANT EVENT
CT chest ordered by primary team for hypotension and hypoxia with ambulation. No PE seen however she has a persistent small right sided pneumothorax. Reviewed CT scan with Dr Barnett and his recommendations are to leave the chest tube clamped and repeat a chest xray in the morning. If the pneumo remains small then the chest tube may be able to be removed. If she develops any acute dyspnea or hypoxia then we will unclamp the tube. Discussed with patient and nursing staff. CXR ordered for tomorrow morning.

## 2022-10-09 NOTE — PROGRESS NOTES
[facility] - 4th Floor Medical Telemetry  Pulmonary Critical Care Note    Patient Name: Cnythia Hanson  MRN: [id number]  Admission Date: 10/7/2022  Hospital Length of Stay: 2 days  Code Status: Full Code  Attending Provider: Jayesh Garza MD  Primary Care Provider: Silvana Maciel MD     Subjective:     HPI:   This is a 61 year old female patient of Dr Rosario who was found to have a RUL lung mass on CT lung cancer screening in 2022. She underwent navigational bronchoscopy on 22 which was non diagnostic. She underwent PET which revealed the mass was hypermetabolic therefore she was referred for CT guided needle biopsy. She underwent CT guided needle biopsy on 10/7/22 by IR which was complicated by a pneumothorax requiring chest tube placement. CXR this AM with continued right apical pneumothorax but appears smaller per my review.       Past Medical History:   Diagnosis Date    Cholelithiases     Lung mass     Macular degeneration        Past Surgical History:   Procedure Laterality Date     SECTION N/A     NAVIGATIONAL BRONCHOSCOPY N/A 2022    Procedure: BRONCHOSCOPY, NAVIGATIONAL;  Surgeon: Brett Rosario MD;  Location: [facility] BRONCHOSCOPY LAB;  Service: Pulmonary;  Laterality: N/A;    TUBAL LIGATION         Social History     Socioeconomic History    Marital status:    Tobacco Use    Smoking status: Every Day     Packs/day: 1.00     Years: 35.00     Pack years: 35.00     Types: Cigarettes    Smokeless tobacco: Never    Tobacco comments:     Now smoking 1-3 cigarettes per day   Substance and Sexual Activity    Alcohol use: Yes     Alcohol/week: 6.0 standard drinks     Types: 6 Cans of beer per week    Drug use: Not Currently    Sexual activity: Yes           Current Outpatient Medications   Medication Instructions    loratadine (CLARITIN) 10 mg, Oral, Daily    omeprazole (PRILOSEC) 20 mg, Oral, Daily             Review of Systems   All other systems reviewed  and are negative.       Objective:       Intake/Output Summary (Last 24 hours) at 10/9/2022 0929  Last data filed at 10/9/2022 0443  Gross per 24 hour   Intake 480 ml   Output 2300 ml   Net -1820 ml           Vital Signs (Most Recent):  Temp: 98 °F (36.7 °C) (10/09/22 0717)  Pulse: 60 (10/09/22 0717)  Resp: 18 (10/09/22 0834)  BP: (!) 92/48 (10/09/22 0717)  SpO2: 96 % (10/09/22 0717)    Body mass index is 22.13 kg/m².  Weight: 60.3 kg (133 lb) Vital Signs (24h Range):  Temp:  [97.6 °F (36.4 °C)-100.9 °F (38.3 °C)] 98 °F (36.7 °C)  Pulse:  [60-69] 60  Resp:  [15-20] 18  SpO2:  [92 %-97 %] 96 %  BP: ()/(48-72) 92/48     Physical Exam  Vitals reviewed.   Constitutional:       Appearance: Normal appearance.   HENT:      Head: Normocephalic and atraumatic.   Cardiovascular:      Rate and Rhythm: Normal rate.      Heart sounds: Normal heart sounds.   Pulmonary:      Effort: Pulmonary effort is normal.      Breath sounds: Normal breath sounds.      Comments: Right sided chest tube in place  Abdominal:      Palpations: Abdomen is soft.   Musculoskeletal:      Cervical back: Neck supple.   Neurological:      General: No focal deficit present.      Mental Status: She is alert and oriented to person, place, and time.         Lines/Drains/Airways       Drain  Duration                  Chest Tube 10/07/22 1431 Right Pleural 10 Fr. 1 day              Peripheral Intravenous Line  Duration                  Peripheral IV - Single Lumen 08/25/22 0704 45 days         Peripheral IV - Single Lumen 08/25/22 0710 18 G Left;Posterior Hand 45 days         Peripheral IV - Single Lumen 10/07/22 0620 20 G Anterior;Distal;Left Forearm 2 days                    Significant Labs:    Lab Results   Component Value Date    WBC 7.6 10/07/2022    HGB 14.4 10/07/2022    HCT 43.4 10/07/2022    MCV 92.7 10/07/2022     10/07/2022         BMP  Lab Results   Component Value Date     10/07/2022    K 3.9 10/07/2022    CO2 28 10/07/2022     BUN 13.4 10/07/2022    CREATININE 0.65 10/07/2022    CALCIUM 8.6 10/07/2022       ABG  No results for input(s): PH, PO2, PCO2, HCO3, BE in the last 168 hours.         Significant Imaging:  I have reviewed the pertinent imaging within the past 24 hours.        Assessment/Plan:     Assessment  Right upper lobe hypermetabolic lung mass post CT guided needle biopsy on 10/7 complicated by pneumothorax requiring chest tube placement      Plan  Chest xray this Am with persistent right apical pneumothorax but does appear smaller. Will clamp chest tube and repeat CXR in 6 hours. If pneumothorax worsens then we will place chest tube back to suction but if remains small/resolved we will remove chest tube.       FELICIA Finley  Pulmonary Critical Care Medicine  [facility] - 4th Floor Medical Telemetry

## 2022-10-09 NOTE — PROGRESS NOTES
[facility] MEDICINE   PROGRESS NOTE        CHIEF COMPLAINT   Hospital follow up    HOSPITAL COURSE   61-year-old female with no past medical history.  She was found to have a right upper lobe spiculated mass on routine screening.  She has a extensive smoking history however has cut back significantly to only a few cigarettes per day.  She was sent to outpatient pulmonary clinic and PET scan noted hypermetabolic lesion and thus initially bronchoscopy was performed however unsuccessful.  She was sent for IR guided biopsy today and during the biopsy was complicated by small pneumothorax subsequently requiring a small pigtail catheter placement.  We have been asked to admit and continue monitoring of this pneumothorax.  At this time patient denies having any complaints besides just soreness all over.  Otherwise she is relatively healthy and active at baseline.    Today  Seen examined this morning.  Fevers recorded from last night however when speaking to the nurse that was an incorrect entry.  Patient is doing well without any complaints.  She does still become hypoxic with ambulation.  Pigtail catheter to be clamped per Pulmonary.        OBJECTIVE/PHYSICAL EXAM     VITAL SIGNS (MOST RECENT):  Temp: 98 °F (36.7 °C) (10/09/22 0717)  Pulse: 60 (10/09/22 0717)  Resp: 18 (10/09/22 0834)  BP: (!) 92/48 (10/09/22 0717)  SpO2: 96 % (10/09/22 0717)   VITAL SIGNS (24 HOUR RANGE):  Temp:  [97.6 °F (36.4 °C)-100.9 °F (38.3 °C)] 98 °F (36.7 °C)  Pulse:  [60-69] 60  Resp:  [15-20] 18  SpO2:  [92 %-97 %] 96 %  BP: ()/(48-72) 92/48   GENERAL: In no acute distress, afebrile  HEENT:  CHEST:  Right-sided pigtail catheter, diminished on the right  HEART: S1, S2, no appreciable murmur  ABDOMEN: Soft, nontender, BS +  MSK: Warm, no lower extremity edema, no clubbing or cyanosis  NEUROLOGIC: Alert and oriented x4, moving all extremities with good strength    INTEGUMENTARY:  PSYCHIATRY:        ASSESSMENT/PLAN   Iatrogenic right-sided pneumothorax-status post small bore pigtail catheter   Suspected primary lung cancer     History of smoking        Pulmonary following.  Clamping pigtail catheter this morning with repeat chest x-ray in 6 hours.  Likely will need CV surgery evaluation for resection/lobectomy once we have pathology.  Can be done as outpatient.  Pending pathology for official report.  Preliminary while during biopsy was malignancy.  This was relayed to the patient and she is aware as she was expecting this already  Low normal blood pressure continue to monitor.  Will obtain CT PE protocol to rule out PE.     DVT prophylaxis:  Lovenox 40    Anticipated discharge and disposition:   __________________________________________________________________________    LABS/MICRO/MEDS/DIAGNOSTICS       LABS  Recent Labs     10/07/22  2148      K 3.9   CHLORIDE 105   CO2 28   BUN 13.4   CREATININE 0.65   GLUCOSE 116*   CALCIUM 8.6   ALKPHOS 52   AST 16   ALT 14   ALBUMIN 3.6     Recent Labs     10/07/22  0622   WBC 7.6   RBC 4.68   HCT 43.4   MCV 92.7          MICROBIOLOGY  Microbiology Results (last 7 days)       Procedure Component Value Units Date/Time    Blood Culture [[id number]]     Order Status: Canceled Specimen: Blood             MEDICATIONS   enoxaparin  40 mg Subcutaneous Daily      INFUSIONS      DIAGNOSTIC TESTS  X-Ray Chest 1 View   Final Result      Persistent right pneumothorax with small bore chest tube in place.      Unchanged right suprahilar lesion.         Electronically signed by: Veronica Duckworth   Date:    10/09/2022   Time:    08:20      X-Ray Chest 1 View   Final Result      Persistent right apical pneumothorax with no significant interval change.         Electronically signed by: Hannah Vick MD   Date:    10/08/2022   Time:    08:00      X-Ray Chest 1 View   Final Result      Placement of right-sided pleural catheter with  decreasing size of the pneumothorax.         Electronically signed by: Eve Fonseca   Date:    10/07/2022   Time:    15:19      CT Guided Abscess Drainage With Catheter   Final Result      Successful placement of a 8 french drain into the right pneumothorax.         Electronically signed by: Alen Spivey   Date:    10/07/2022   Time:    14:57      X-Ray Chest 1 View   Final Result      Right-sided pneumothorax has increased in size.         Electronically signed by: Alen Spivey   Date:    10/07/2022   Time:    13:42      X-Ray Chest 1 View   Final Result      Stable right upper lobe lung nodule and right-sided pneumothorax         Electronically signed by: Jaylene Aldana   Date:    10/07/2022   Time:    11:53      X-Ray Chest 1 View   Final Result      No visible pneumothorax.         Electronically signed by: Eve Fonseca   Date:    10/07/2022   Time:    10:55      CT Biopsy Lung w/ guidance   Final Result      CT-guided right upper lobe biopsy as described above.  Small surrounding hematoma as expected.  Small pneumothorax will be followed for possible chest tube placement.         Electronically signed by: Alen Spivey   Date:    10/07/2022   Time:    11:05               All diagnosis and differential diagnosis have been reviewed; assessment and plan has been documented. I have personally reviewed the labs and test results that are presently available; I have reviewed the patients medication list. I have reviewed the consulting providers response and recommendations. I have reviewed or attempted to review medical records based upon their availability.  All of the patient's questions have been addressed and answered. Patient's is agreeable to the above stated plan. I will continue to monitor closely and make adjustments to medical management as needed.  This document was created using M*Modal Fluency Direct.  Transcription errors may have been made.  Please contact me if any questions may rise  regarding documentation to clarify verbiage.        Jayesh Garza MD   10/09/2022   Internal Medicine

## 2022-10-10 VITALS
BODY MASS INDEX: 22.16 KG/M2 | DIASTOLIC BLOOD PRESSURE: 63 MMHG | WEIGHT: 133 LBS | TEMPERATURE: 99 F | SYSTOLIC BLOOD PRESSURE: 106 MMHG | HEIGHT: 65 IN | RESPIRATION RATE: 18 BRPM | HEART RATE: 73 BPM | OXYGEN SATURATION: 97 %

## 2022-10-10 PROBLEM — R91.8 LUNG MASS: Status: ACTIVE | Noted: 2022-10-10

## 2022-10-10 PROCEDURE — 25000003 PHARM REV CODE 250: Performed by: NURSE PRACTITIONER

## 2022-10-10 RX ADMIN — HYDROCODONE BITARTRATE AND ACETAMINOPHEN 1 TABLET: 5; 325 TABLET ORAL at 01:10

## 2022-10-10 RX ADMIN — HYDROCODONE BITARTRATE AND ACETAMINOPHEN 1 TABLET: 5; 325 TABLET ORAL at 07:10

## 2022-10-10 NOTE — DISCHARGE SUMMARY
[facility]  HOSPITAL MEDICINE   DISCHARGE SUMMARY    Patient Name: Cynthia Hanson  MRN: [id number]  Admission Date: 10/7/2022  Hospital Length of Stay: 3 days  Discharge Date and Time: 10/10/2022  Discharge Provider: Jayesh Garza  Primary Care Provider: Silvana Maciel MD      HOSPITAL COURSE   61-year-old female with no past medical history.  She was found to have a right upper lobe spiculated mass on routine screening.  She has a extensive smoking history however has cut back significantly to only a few cigarettes per day.  She was sent to outpatient pulmonary clinic and PET scan noted hypermetabolic lesion and thus initially bronchoscopy was performed however unsuccessful.  She was sent for IR guided biopsy today and during the biopsy was complicated by small pneumothorax subsequently requiring a small pigtail catheter placement.  We have been asked to admit and continue monitoring of this pneumothorax.  At this time patient denies having any complaints besides just soreness all over.  Otherwise she is relatively healthy and active at baseline.  Pigtail catheter was managed by Pulmonary on the floor.  Repeat chest x-rays remained with a small residual pneumothorax however stable after the catheter was clamped the following day.  Catheter was removed this morning and has been cleared by Pulmonary for discharge home.  We will also refer her back to Pulmonary Clinic as well as CV surgery referral for evaluation lobectomy.  Of note when I spoke with Dr. Spivey he told me pathologist on site give the preliminary report of malignancy and thus has been referred to CV surgery with those results.  Pending final pathology but expect it should be back in the next 1 or 2 days.        PHYSICAL EXAM     Most Recent Vital Signs:  Temp: 98.7 °F (37.1 °C) (10/10/22 0735)  Pulse: 73 (10/10/22 0735)  Resp: 18 (10/10/22 0738)  BP: 106/63 (10/10/22 0735)  SpO2: 97 % (10/10/22 0735)   GENERAL: In  no acute distress, afebrile  HEENT:  CHEST:  Slightly diminished on the right  HEART: S1, S2, no appreciable murmur  ABDOMEN: Soft, nontender, BS +  MSK: Warm, no lower extremity edema, no clubbing or cyanosis  NEUROLOGIC: Alert and oriented x4, moving all extremities with good strength   INTEGUMENTARY:  PSYCHIATRY:          DISCHARGE DIAGNOSIS   Iatrogenic right-sided pneumothorax-status post small bore pigtail catheter   Suspected primary lung cancer   Residual small right-sided pneumothorax    History of smoking         _____________________________________________________________________________      DISCHARGE MED REC     Current Discharge Medication List        CONTINUE these medications which have NOT CHANGED    Details   loratadine (CLARITIN) 10 mg tablet Take 10 mg by mouth once daily.      omeprazole (PRILOSEC) 20 MG capsule Take 20 mg by mouth once daily.                CONSULTS     Consults (From admission, onward)          Status Ordering Provider     Inpatient consult to Pulmonology  Once        Provider:  MARY Santos MD    Completed RAMÓN GARZA     Inpatient consult to Hospital Medicine  Once        Provider:  Ramón Garza MD    Acknowledged CHANO SALAZAR              FOLLOW UP      Follow-up Information       Brett Rosario MD Follow up.    Specialty: Pulmonary Disease  Contact information:  [address]  [address]  [place] [address]  [phone]               Topher Gaston Iv, MD Follow up in 1 week(s).    Specialty: Cardiothoracic Surgery  Contact information:  [address]  [address]  [place] [address]  [phone]                                 DISCHARGE INSTRUCTIONS     Explained in detail to the patient about the discharge plan, medications, and follow-up visits. Pt understands and agrees with the treatment plan.  Discharged Condition: stable  Diet as tolerated  Activities as tolerated  Discharge to:  Home     TIME SPENT ON DISCHARGE   35 minutes        Ramón ALBARRAN  Jayshree ALLEN, on 10/10/2022  Internal Medicine  Department of [facility]    This document was created using electronic dictation services.  Please excuse any errors that may have been made.  Contact me if any questions regarding documentation to clarify verbiage.

## 2022-10-10 NOTE — PROGRESS NOTES
[facility] - 4th Floor Medical Telemetry  Pulmonary Critical Care Note    Patient Name: Cynthia Hanson  MRN: [id number]  Admission Date: 10/7/2022  Hospital Length of Stay: 3 days  Code Status: Full Code  Attending Provider: Jayesh Garza MD  Primary Care Provider: Silvana Maciel MD     Subjective:     HPI:   This is a 61 year old female patient of Dr Rosario who was found to have a RUL lung mass on CT lung cancer screening in 2022. She underwent navigational bronchoscopy on 22 which was non diagnostic. She underwent PET which revealed the mass was hypermetabolic therefore she was referred for CT guided needle biopsy. She underwent CT guided needle biopsy on 10/7/22 by IR which was complicated by a pneumothorax requiring chest tube placement.     CT of chest from 10/9 was ordered due to hypotension and hypoxia with ambulation. It was negative for PE, small right pneumothorax with chest tube in place. CXR this AM revealed persistent small right pneumothorax. Patient denies shortness of breath. Reports difficultly taking a deep breath due to pain from tube, otherwise no complaints. Currently sitting on sofa, reading, on room air.      Past Medical History:   Diagnosis Date    Cholelithiases     Lung mass     Macular degeneration        Past Surgical History:   Procedure Laterality Date     SECTION N/A     NAVIGATIONAL BRONCHOSCOPY N/A 2022    Procedure: BRONCHOSCOPY, NAVIGATIONAL;  Surgeon: Brett Rosario MD;  Location: [facility] BRONCHOSCOPY LAB;  Service: Pulmonary;  Laterality: N/A;    TUBAL LIGATION         Social History     Socioeconomic History    Marital status:    Tobacco Use    Smoking status: Every Day     Packs/day: 1.00     Years: 35.00     Pack years: 35.00     Types: Cigarettes    Smokeless tobacco: Never    Tobacco comments:     Now smoking 1-3 cigarettes per day   Substance and Sexual Activity    Alcohol use: Yes     Alcohol/week: 6.0 standard  drinks     Types: 6 Cans of beer per week    Drug use: Not Currently    Sexual activity: Yes           Current Outpatient Medications   Medication Instructions    loratadine (CLARITIN) 10 mg, Oral, Daily    omeprazole (PRILOSEC) 20 mg, Oral, Daily             Review of Systems   All other systems reviewed and are negative.       Objective:       Intake/Output Summary (Last 24 hours) at 10/10/2022 0814  Last data filed at 10/10/2022 0217  Gross per 24 hour   Intake 1320 ml   Output 1400 ml   Net -80 ml           Vital Signs (Most Recent):  Temp: 98.7 °F (37.1 °C) (10/10/22 0735)  Pulse: 73 (10/10/22 0735)  Resp: 18 (10/10/22 0738)  BP: 106/63 (10/10/22 0735)  SpO2: 97 % (10/10/22 0735)    Body mass index is 22.13 kg/m².  Weight: 60.3 kg (133 lb) Vital Signs (24h Range):  Temp:  [98 °F (36.7 °C)-98.7 °F (37.1 °C)] 98.7 °F (37.1 °C)  Pulse:  [61-73] 73  Resp:  [17-18] 18  SpO2:  [95 %-99 %] 97 %  BP: ()/(61-68) 106/63     Physical Exam  Vitals reviewed.   Constitutional:       Appearance: Normal appearance.   HENT:      Head: Normocephalic and atraumatic.   Cardiovascular:      Rate and Rhythm: Normal rate.      Heart sounds: Normal heart sounds.   Pulmonary:      Effort: Pulmonary effort is normal.      Breath sounds: Normal breath sounds.      Comments: Right sided chest tube in place  Abdominal:      Palpations: Abdomen is soft.   Musculoskeletal:      Cervical back: Neck supple.   Neurological:      General: No focal deficit present.      Mental Status: She is alert and oriented to person, place, and time.         Lines/Drains/Airways       Drain  Duration                  Chest Tube 10/07/22 1431 Right Pleural 10 Fr. 2 days              Peripheral Intravenous Line  Duration                  Peripheral IV - Single Lumen 08/25/22 0704 46 days         Peripheral IV - Single Lumen 10/07/22 0620 20 G Anterior;Distal;Left Forearm 3 days                    Significant Labs:    Lab Results   Component Value Date     WBC 7.6 10/07/2022    HGB 14.4 10/07/2022    HCT 43.4 10/07/2022    MCV 92.7 10/07/2022     10/07/2022         BMP  Lab Results   Component Value Date     10/07/2022    K 3.9 10/07/2022    CO2 28 10/07/2022    BUN 13.4 10/07/2022    CREATININE 0.65 10/07/2022    CALCIUM 8.6 10/07/2022       ABG  No results for input(s): PH, PO2, PCO2, HCO3, BE in the last 168 hours.         Significant Imaging:  I have reviewed the pertinent imaging within the past 24 hours.        Assessment/Plan:     Assessment  Right upper lobe hypermetabolic lung mass post CT guided needle biopsy on 10/7 complicated by pneumothorax requiring chest tube placement      Plan  - Chest xray this Am with persistent small right apical pneumothorax. Chest tube has remain clamp and patient is currently asymptomatic. She denies shortness of breath. Will have MD view image and possibly remove CT this AM.   - Pathology remains pending     FELICIA Gómez  Pulmonary Critical Care Medicine  [facility] - 4th Floor Medical Telemetry

## 2022-10-10 NOTE — PLAN OF CARE
Problem: Adult Inpatient Plan of Care  Goal: Plan of Care Review  Outcome: Ongoing, Progressing  Goal: Patient-Specific Goal (Individualized)  Outcome: Ongoing, Progressing  Goal: Absence of Hospital-Acquired Illness or Injury  Outcome: Ongoing, Progressing  Goal: Optimal Comfort and Wellbeing  Outcome: Ongoing, Progressing  Intervention: Monitor Pain and Promote Comfort  Flowsheets (Taken 10/9/2022 8124)  Pain Management Interventions: medication offered  Goal: Readiness for Transition of Care  Outcome: Ongoing, Progressing

## 2022-10-11 ENCOUNTER — PATIENT OUTREACH (OUTPATIENT)
Dept: ADMINISTRATIVE | Facility: CLINIC | Age: 62
End: 2022-10-11
Payer: COMMERCIAL

## 2022-10-11 RX ORDER — FAMOTIDINE 20 MG/1
20 TABLET, FILM COATED ORAL DAILY
COMMUNITY

## 2022-10-11 RX ORDER — CHOLECALCIFEROL (VITAMIN D3) 25 MCG
1000 TABLET ORAL DAILY
COMMUNITY

## 2022-10-11 RX ORDER — MULTIVIT WITH MINERALS/HERBS
1 TABLET ORAL DAILY
COMMUNITY

## 2022-10-11 NOTE — PROGRESS NOTES
C3 nurse spoke with Cynthia Hanson  for a TCC post hospital discharge follow up call. The patient has a scheduled HOS appointment with Dr. Gaston  on 10/18/2022 @ 8 am.  Stated appointments changed and appointment will be made to see Dr. Rosario after appointment with Dr. Gaston.  Patient stated taking glucosamine 160 mg daily, Kelp 41 mg 1 tablet daily and Echinacea (herbal)450 mg 1 capsule daily

## 2022-10-12 LAB
ESTROGEN SERPL-MCNC: NORMAL PG/ML
INSULIN SERPL-ACNC: NORMAL U[IU]/ML
LAB AP CLINICAL INFORMATION: NORMAL
LAB AP GROSS DESCRIPTION: NORMAL
LAB AP INTRA OP: NORMAL
LAB AP REPORT FOOTNOTES: NORMAL
T3RU NFR SERPL: NORMAL %

## 2022-10-21 ENCOUNTER — HOSPITAL ENCOUNTER (OUTPATIENT)
Dept: RADIOLOGY | Facility: HOSPITAL | Age: 62
Discharge: HOME | End: 2022-10-21
Payer: COMMERCIAL

## 2022-10-21 DIAGNOSIS — R91.8 LUNG MASS: ICD-10-CM

## 2022-10-21 DIAGNOSIS — J93.9 PNEUMOTHORAX, UNSPECIFIED TYPE: ICD-10-CM

## 2022-10-21 PROCEDURE — 71046 X-RAY EXAM CHEST 2 VIEWS: CPT | Mod: TC

## 2022-10-25 ENCOUNTER — OFFICE VISIT (OUTPATIENT)
Dept: CARDIAC SURGERY | Facility: CLINIC | Age: 62
End: 2022-10-25
Payer: COMMERCIAL

## 2022-10-25 VITALS
HEIGHT: 65 IN | DIASTOLIC BLOOD PRESSURE: 73 MMHG | BODY MASS INDEX: 22.16 KG/M2 | SYSTOLIC BLOOD PRESSURE: 133 MMHG | WEIGHT: 133 LBS | HEART RATE: 77 BPM

## 2022-10-25 DIAGNOSIS — C34.11 MALIGNANT NEOPLASM OF UPPER LOBE OF RIGHT LUNG: Primary | ICD-10-CM

## 2022-10-25 PROBLEM — J93.9 PNEUMOTHORAX ON RIGHT: Status: ACTIVE | Noted: 2022-10-25

## 2022-10-25 PROCEDURE — 3075F SYST BP GE 130 - 139MM HG: CPT | Mod: CPTII,,, | Performed by: SPECIALIST

## 2022-10-25 PROCEDURE — 3078F PR MOST RECENT DIASTOLIC BLOOD PRESSURE < 80 MM HG: ICD-10-PCS | Mod: CPTII,,, | Performed by: SPECIALIST

## 2022-10-25 PROCEDURE — 1111F PR DISCHARGE MEDS RECONCILED W/ CURRENT OUTPATIENT MED LIST: ICD-10-PCS | Mod: CPTII,,, | Performed by: SPECIALIST

## 2022-10-25 PROCEDURE — 3075F PR MOST RECENT SYSTOLIC BLOOD PRESS GE 130-139MM HG: ICD-10-PCS | Mod: CPTII,,, | Performed by: SPECIALIST

## 2022-10-25 PROCEDURE — 1159F PR MEDICATION LIST DOCUMENTED IN MEDICAL RECORD: ICD-10-PCS | Mod: CPTII,,, | Performed by: SPECIALIST

## 2022-10-25 PROCEDURE — 99204 PR OFFICE/OUTPT VISIT, NEW, LEVL IV, 45-59 MIN: ICD-10-PCS | Mod: ,,, | Performed by: SPECIALIST

## 2022-10-25 PROCEDURE — 1111F DSCHRG MED/CURRENT MED MERGE: CPT | Mod: CPTII,,, | Performed by: SPECIALIST

## 2022-10-25 PROCEDURE — 99204 OFFICE O/P NEW MOD 45 MIN: CPT | Mod: ,,, | Performed by: SPECIALIST

## 2022-10-25 PROCEDURE — 3078F DIAST BP <80 MM HG: CPT | Mod: CPTII,,, | Performed by: SPECIALIST

## 2022-10-25 PROCEDURE — 1159F MED LIST DOCD IN RCRD: CPT | Mod: CPTII,,, | Performed by: SPECIALIST

## 2022-10-25 RX ORDER — ECHINACEA PURPUREA EXTRACT 125 MG
1 TABLET ORAL DAILY
COMMUNITY
Start: 2010-10-01

## 2022-10-25 NOTE — H&P (VIEW-ONLY)
[facility]  History & Physical  Cardiothoracic Surgery    SUBJECTIVE:     Chief Complaint/Reason for Visit:   Chief Complaint   Patient presents with    Pre-op Exam     Pulmonary Clinic RE: Resection        History of Present Illness:  Patient is a 62 y.o. female presents referred by Dr. Rosario for lung resection.  She is a 62-year-old smoker who was recently diagnosed with adenocarcinoma of the right upper lobe by CT-guided biopsy.  The lesion potentially is involved in the middle lobe also.  She may need a bilobectomy.  It is a 4 cm tumor.  PET scan does not reveal any metastatic disease.  Unfortunately she does not have PFTs done yet.  We will try to arrange these as soon as feasible.  I have discussed the risks, benefits, and alternatives in great detail with the patient.  She understands and would like to proceed.  Given the size of this tumor we are going to need to do this with an open thoracotomy.  She is very active and walks extensively with no shortness of breath whatsoever.  Review of patient's allergies indicates:   Allergen Reactions    Chlorine     Msg [glutamic acid] Other (See Comments) and Edema     Migraine headache    Penicillins Rash       Past Medical History:   Diagnosis Date    Cholelithiases     Lung mass     Macular degeneration      Past Surgical History:   Procedure Laterality Date     SECTION N/A     NAVIGATIONAL BRONCHOSCOPY N/A 2022    Procedure: BRONCHOSCOPY, NAVIGATIONAL;  Surgeon: Brett Rosario MD;  Location: [facility] BRONCHOSCOPY LAB;  Service: Pulmonary;  Laterality: N/A;    TUBAL LIGATION       Family History   Problem Relation Age of Onset    Diabetes Maternal Grandmother     Vision loss Maternal Grandmother     Heart disease Paternal Grandfather     Diabetes Maternal Aunt      Social History     Tobacco Use    Smoking status: Some Days     Packs/day: 0.25     Years: 35.00     Pack years: 8.75     Types: Cigarettes    Smokeless tobacco:  Never    Tobacco comments:     Now smoking 1-3 cigarettes per day   Substance Use Topics    Alcohol use: Yes     Alcohol/week: 6.0 standard drinks     Types: 6 Cans of beer per week    Drug use: Never        Review of Systems:  Review of Systems   Constitutional: Negative.   HENT: Negative.     Eyes: Negative.    Cardiovascular: Negative.    Respiratory: Negative.     Endocrine: Negative.    Hematologic/Lymphatic: Negative.    Skin: Negative.    Musculoskeletal: Negative.    Gastrointestinal: Negative.    Genitourinary: Negative.    Neurological: Negative.      OBJECTIVE:     Vital Signs (Most Recent):  Pulse: 77 (10/25/22 1006)  BP: 133/73 (10/25/22 1006)    Admission: Weight: 60.3 kg (133 lb) (10/25/22 1006)   Most Recent: Weight: 60.3 kg (133 lb) (10/25/22 1006)    Physical Exam:  Physical Exam  Constitutional:       Appearance: Normal appearance.   HENT:      Head: Normocephalic and atraumatic.   Eyes:      Pupils: Pupils are equal, round, and reactive to light.   Neck:      Vascular: No carotid bruit.   Cardiovascular:      Rate and Rhythm: Normal rate and regular rhythm.      Pulses: Normal pulses.   Pulmonary:      Breath sounds: Normal breath sounds.   Abdominal:      Palpations: Abdomen is soft.      Tenderness: There is no abdominal tenderness.   Musculoskeletal:         General: Normal range of motion.      Cervical back: Neck supple.   Neurological:      General: No focal deficit present.      Mental Status: She is alert.   Psychiatric:         Mood and Affect: Mood normal.       Diagnostic Results:  CT: Reviewed      ASSESSMENT/PLAN:     No diagnosis found.  4.2 cm Adenocarcinoma of the right upper lobe   Tobacco abuse   Obtain PFTs  Planning open right upper pulmonary lobectomy with lymph node dissection

## 2022-10-25 NOTE — PROGRESS NOTES
[facility]  History & Physical  Cardiothoracic Surgery    SUBJECTIVE:     Chief Complaint/Reason for Visit:   Chief Complaint   Patient presents with    Pre-op Exam     Pulmonary Clinic RE: Resection        History of Present Illness:  Patient is a 62 y.o. female presents referred by Dr. Rosario for lung resection.  She is a 62-year-old smoker who was recently diagnosed with adenocarcinoma of the right upper lobe by CT-guided biopsy.  The lesion potentially is involved in the middle lobe also.  She may need a bilobectomy.  It is a 4 cm tumor.  PET scan does not reveal any metastatic disease.  Unfortunately she does not have PFTs done yet.  We will try to arrange these as soon as feasible.  I have discussed the risks, benefits, and alternatives in great detail with the patient.  She understands and would like to proceed.  Given the size of this tumor we are going to need to do this with an open thoracotomy.  She is very active and walks extensively with no shortness of breath whatsoever.  Review of patient's allergies indicates:   Allergen Reactions    Chlorine     Msg [glutamic acid] Other (See Comments) and Edema     Migraine headache    Penicillins Rash       Past Medical History:   Diagnosis Date    Cholelithiases     Lung mass     Macular degeneration      Past Surgical History:   Procedure Laterality Date     SECTION N/A     NAVIGATIONAL BRONCHOSCOPY N/A 2022    Procedure: BRONCHOSCOPY, NAVIGATIONAL;  Surgeon: Brett Rosario MD;  Location: [facility] BRONCHOSCOPY LAB;  Service: Pulmonary;  Laterality: N/A;    TUBAL LIGATION       Family History   Problem Relation Age of Onset    Diabetes Maternal Grandmother     Vision loss Maternal Grandmother     Heart disease Paternal Grandfather     Diabetes Maternal Aunt      Social History     Tobacco Use    Smoking status: Some Days     Packs/day: 0.25     Years: 35.00     Pack years: 8.75     Types: Cigarettes    Smokeless tobacco:  Never    Tobacco comments:     Now smoking 1-3 cigarettes per day   Substance Use Topics    Alcohol use: Yes     Alcohol/week: 6.0 standard drinks     Types: 6 Cans of beer per week    Drug use: Never        Review of Systems:  Review of Systems   Constitutional: Negative.   HENT: Negative.     Eyes: Negative.    Cardiovascular: Negative.    Respiratory: Negative.     Endocrine: Negative.    Hematologic/Lymphatic: Negative.    Skin: Negative.    Musculoskeletal: Negative.    Gastrointestinal: Negative.    Genitourinary: Negative.    Neurological: Negative.      OBJECTIVE:     Vital Signs (Most Recent):  Pulse: 77 (10/25/22 1006)  BP: 133/73 (10/25/22 1006)    Admission: Weight: 60.3 kg (133 lb) (10/25/22 1006)   Most Recent: Weight: 60.3 kg (133 lb) (10/25/22 1006)    Physical Exam:  Physical Exam  Constitutional:       Appearance: Normal appearance.   HENT:      Head: Normocephalic and atraumatic.   Eyes:      Pupils: Pupils are equal, round, and reactive to light.   Neck:      Vascular: No carotid bruit.   Cardiovascular:      Rate and Rhythm: Normal rate and regular rhythm.      Pulses: Normal pulses.   Pulmonary:      Breath sounds: Normal breath sounds.   Abdominal:      Palpations: Abdomen is soft.      Tenderness: There is no abdominal tenderness.   Musculoskeletal:         General: Normal range of motion.      Cervical back: Neck supple.   Neurological:      General: No focal deficit present.      Mental Status: She is alert.   Psychiatric:         Mood and Affect: Mood normal.       Diagnostic Results:  CT: Reviewed      ASSESSMENT/PLAN:     No diagnosis found.  4.2 cm Adenocarcinoma of the right upper lobe   Tobacco abuse   Obtain PFTs  Planning open right upper pulmonary lobectomy with lymph node dissection

## 2022-10-28 DIAGNOSIS — C34.11 MALIGNANT NEOPLASM OF UPPER LOBE OF RIGHT LUNG: Primary | ICD-10-CM

## 2022-10-31 ENCOUNTER — PROCEDURE VISIT (OUTPATIENT)
Dept: RESPIRATORY THERAPY | Facility: HOSPITAL | Age: 62
DRG: 165 | End: 2022-10-31
Attending: NURSE PRACTITIONER
Payer: COMMERCIAL

## 2022-10-31 ENCOUNTER — HOSPITAL ENCOUNTER (OUTPATIENT)
Dept: RADIOLOGY | Facility: HOSPITAL | Age: 62
Discharge: HOME | DRG: 165 | End: 2022-10-31
Attending: SPECIALIST
Payer: COMMERCIAL

## 2022-10-31 DIAGNOSIS — C34.11 MALIGNANT NEOPLASM OF UPPER LOBE OF RIGHT LUNG: ICD-10-CM

## 2022-10-31 DIAGNOSIS — Z01.818 PRE-OP EXAM: Primary | ICD-10-CM

## 2022-10-31 PROCEDURE — 94060 EVALUATION OF WHEEZING: CPT

## 2022-10-31 PROCEDURE — 94729 PR C02/MEMBANE DIFFUSE CAPACITY: ICD-10-PCS | Mod: 26,,, | Performed by: INTERNAL MEDICINE

## 2022-10-31 PROCEDURE — 71046 X-RAY EXAM CHEST 2 VIEWS: CPT | Mod: TC

## 2022-10-31 PROCEDURE — 94726 PLETHYSMOGRAPHY LUNG VOLUMES: CPT | Mod: 26,,, | Performed by: INTERNAL MEDICINE

## 2022-10-31 PROCEDURE — 94729 DIFFUSING CAPACITY: CPT | Mod: 26,,, | Performed by: INTERNAL MEDICINE

## 2022-10-31 PROCEDURE — 94060 PR EVAL OF BRONCHOSPASM: ICD-10-PCS | Mod: 26,,, | Performed by: INTERNAL MEDICINE

## 2022-10-31 PROCEDURE — 94060 EVALUATION OF WHEEZING: CPT | Mod: 26,,, | Performed by: INTERNAL MEDICINE

## 2022-10-31 PROCEDURE — 94729 DIFFUSING CAPACITY: CPT

## 2022-10-31 PROCEDURE — 94726 PULM FUNCT TST PLETHYSMOGRAP: ICD-10-PCS | Mod: 26,,, | Performed by: INTERNAL MEDICINE

## 2022-10-31 PROCEDURE — 94727 GAS DIL/WSHOT DETER LNG VOL: CPT

## 2022-10-31 RX ORDER — ALBUTEROL SULFATE 0.83 MG/ML
2.5 SOLUTION RESPIRATORY (INHALATION)
Status: COMPLETED | OUTPATIENT
Start: 2022-10-31 | End: 2022-10-31

## 2022-10-31 RX ADMIN — ALBUTEROL SULFATE 2.5 MG: 0.83 SOLUTION RESPIRATORY (INHALATION) at 10:10

## 2022-11-01 ENCOUNTER — ANESTHESIA EVENT (OUTPATIENT)
Dept: SURGERY | Facility: HOSPITAL | Age: 62
DRG: 165 | End: 2022-11-01
Payer: COMMERCIAL

## 2022-11-02 ENCOUNTER — HOSPITAL ENCOUNTER (INPATIENT)
Facility: HOSPITAL | Age: 62
LOS: 3 days | Discharge: HOME | DRG: 165 | End: 2022-11-05
Attending: SPECIALIST | Admitting: SPECIALIST
Payer: COMMERCIAL

## 2022-11-02 ENCOUNTER — ANESTHESIA (OUTPATIENT)
Dept: SURGERY | Facility: HOSPITAL | Age: 62
DRG: 165 | End: 2022-11-02
Payer: COMMERCIAL

## 2022-11-02 DIAGNOSIS — R91.1 LUNG NODULE: ICD-10-CM

## 2022-11-02 DIAGNOSIS — R91.8 LUNG MASS: ICD-10-CM

## 2022-11-02 DIAGNOSIS — C34.11 MALIGNANT NEOPLASM OF UPPER LOBE OF RIGHT LUNG: ICD-10-CM

## 2022-11-02 LAB
BASOPHILS # BLD AUTO: 0.06 X10(3)/MCL (ref 0–0.2)
BASOPHILS NFR BLD AUTO: 0.3 %
EOSINOPHIL # BLD AUTO: 0.17 X10(3)/MCL (ref 0–0.9)
EOSINOPHIL NFR BLD AUTO: 0.9 %
ERYTHROCYTE [DISTWIDTH] IN BLOOD BY AUTOMATED COUNT: 13.2 % (ref 11.5–17)
HCT VFR BLD AUTO: 39.8 % (ref 37–47)
HGB BLD-MCNC: 13 GM/DL (ref 12–16)
IMM GRANULOCYTES # BLD AUTO: 0.11 X10(3)/MCL (ref 0–0.04)
IMM GRANULOCYTES NFR BLD AUTO: 0.6 %
LYMPHOCYTES # BLD AUTO: 1.6 X10(3)/MCL (ref 0.6–4.6)
LYMPHOCYTES NFR BLD AUTO: 8.6 %
MCH RBC QN AUTO: 31 PG (ref 27–31)
MCHC RBC AUTO-ENTMCNC: 32.7 MG/DL (ref 33–36)
MCV RBC AUTO: 94.8 FL (ref 80–94)
MONOCYTES # BLD AUTO: 0.45 X10(3)/MCL (ref 0.1–1.3)
MONOCYTES NFR BLD AUTO: 2.4 %
NEUTROPHILS # BLD AUTO: 16.2 X10(3)/MCL (ref 2.1–9.2)
NEUTROPHILS NFR BLD AUTO: 87.2 %
NRBC BLD AUTO-RTO: 0 %
PLATELET # BLD AUTO: 268 X10(3)/MCL (ref 130–400)
PMV BLD AUTO: 8.8 FL (ref 7.4–10.4)
RBC # BLD AUTO: 4.2 X10(6)/MCL (ref 4.2–5.4)
WBC # SPEC AUTO: 18.6 X10(3)/MCL (ref 4.5–11.5)

## 2022-11-02 PROCEDURE — 38746 PR REMOVE THOR LYMPH NODES RAD REGNL: ICD-10-PCS | Mod: ,,, | Performed by: SPECIALIST

## 2022-11-02 PROCEDURE — 32480 PR REMOVAL OF LUNG,LOBECTOMY: ICD-10-PCS | Mod: AS,RT,,

## 2022-11-02 PROCEDURE — 32480 PARTIAL REMOVAL OF LUNG: CPT | Mod: RT,,, | Performed by: SPECIALIST

## 2022-11-02 PROCEDURE — 37000009 HC ANESTHESIA EA ADD 15 MINS: Performed by: SPECIALIST

## 2022-11-02 PROCEDURE — 32480 PARTIAL REMOVAL OF LUNG: CPT | Mod: AS,RT,,

## 2022-11-02 PROCEDURE — 38746 REMOVE THORACIC LYMPH NODES: CPT | Mod: AS,,,

## 2022-11-02 PROCEDURE — 38746 REMOVE THORACIC LYMPH NODES: CPT | Mod: ,,, | Performed by: SPECIALIST

## 2022-11-02 PROCEDURE — 63600175 PHARM REV CODE 636 W HCPCS: Performed by: NURSE ANESTHETIST, CERTIFIED REGISTERED

## 2022-11-02 PROCEDURE — 25000003 PHARM REV CODE 250: Performed by: NURSE ANESTHETIST, CERTIFIED REGISTERED

## 2022-11-02 PROCEDURE — 63600175 PHARM REV CODE 636 W HCPCS: Performed by: SPECIALIST

## 2022-11-02 PROCEDURE — 71000033 HC RECOVERY, INTIAL HOUR: Performed by: SPECIALIST

## 2022-11-02 PROCEDURE — C1729 CATH, DRAINAGE: HCPCS | Performed by: SPECIALIST

## 2022-11-02 PROCEDURE — 71000039 HC RECOVERY, EACH ADD'L HOUR: Performed by: SPECIALIST

## 2022-11-02 PROCEDURE — 85025 COMPLETE CBC W/AUTO DIFF WBC: CPT | Performed by: SPECIALIST

## 2022-11-02 PROCEDURE — 36000711: Performed by: SPECIALIST

## 2022-11-02 PROCEDURE — 25000003 PHARM REV CODE 250: Performed by: SPECIALIST

## 2022-11-02 PROCEDURE — 25000003 PHARM REV CODE 250

## 2022-11-02 PROCEDURE — 11000001 HC ACUTE MED/SURG PRIVATE ROOM

## 2022-11-02 PROCEDURE — 38746 PR REMOVE THOR LYMPH NODES RAD REGNL: ICD-10-PCS | Mod: AS,,,

## 2022-11-02 PROCEDURE — 63600175 PHARM REV CODE 636 W HCPCS

## 2022-11-02 PROCEDURE — 36000710: Performed by: SPECIALIST

## 2022-11-02 PROCEDURE — 37000008 HC ANESTHESIA 1ST 15 MINUTES: Performed by: SPECIALIST

## 2022-11-02 PROCEDURE — 32480 PR REMOVAL OF LUNG,LOBECTOMY: ICD-10-PCS | Mod: RT,,, | Performed by: SPECIALIST

## 2022-11-02 PROCEDURE — 27201423 OPTIME MED/SURG SUP & DEVICES STERILE SUPPLY: Performed by: SPECIALIST

## 2022-11-02 RX ORDER — VANCOMYCIN HCL IN 5 % DEXTROSE 1G/250ML
1000 PLASTIC BAG, INJECTION (ML) INTRAVENOUS
Status: DISPENSED | OUTPATIENT
Start: 2022-11-02 | End: 2022-11-04

## 2022-11-02 RX ORDER — OXYCODONE HYDROCHLORIDE 5 MG/1
5 TABLET ORAL EVERY 4 HOURS PRN
Status: DISCONTINUED | OUTPATIENT
Start: 2022-11-02 | End: 2022-11-05 | Stop reason: HOSPADM

## 2022-11-02 RX ORDER — ACETAMINOPHEN 10 MG/ML
1000 INJECTION, SOLUTION INTRAVENOUS ONCE
Status: CANCELLED | OUTPATIENT
Start: 2022-11-02 | End: 2022-11-02

## 2022-11-02 RX ORDER — MIDAZOLAM HYDROCHLORIDE 1 MG/ML
INJECTION INTRAMUSCULAR; INTRAVENOUS
Status: DISCONTINUED | OUTPATIENT
Start: 2022-11-02 | End: 2022-11-02

## 2022-11-02 RX ORDER — ENOXAPARIN SODIUM 100 MG/ML
40 INJECTION SUBCUTANEOUS EVERY 24 HOURS
Status: DISCONTINUED | OUTPATIENT
Start: 2022-11-03 | End: 2022-11-05 | Stop reason: HOSPADM

## 2022-11-02 RX ORDER — ONDANSETRON 2 MG/ML
INJECTION INTRAMUSCULAR; INTRAVENOUS
Status: DISCONTINUED | OUTPATIENT
Start: 2022-11-02 | End: 2022-11-02

## 2022-11-02 RX ORDER — LIDOCAINE HYDROCHLORIDE 10 MG/ML
1 INJECTION, SOLUTION EPIDURAL; INFILTRATION; INTRACAUDAL; PERINEURAL ONCE
Status: CANCELLED | OUTPATIENT
Start: 2022-11-02 | End: 2022-11-02

## 2022-11-02 RX ORDER — HYDROMORPHONE HYDROCHLORIDE 2 MG/ML
INJECTION, SOLUTION INTRAMUSCULAR; INTRAVENOUS; SUBCUTANEOUS
Status: DISCONTINUED | OUTPATIENT
Start: 2022-11-02 | End: 2022-11-02

## 2022-11-02 RX ORDER — KETOROLAC TROMETHAMINE 30 MG/ML
15 INJECTION, SOLUTION INTRAMUSCULAR; INTRAVENOUS 4 TIMES DAILY
Status: DISPENSED | OUTPATIENT
Start: 2022-11-02 | End: 2022-11-04

## 2022-11-02 RX ORDER — FAMOTIDINE 10 MG/ML
20 INJECTION INTRAVENOUS ONCE
Status: CANCELLED | OUTPATIENT
Start: 2022-11-02

## 2022-11-02 RX ORDER — GLYCOPYRROLATE 0.2 MG/ML
INJECTION INTRAMUSCULAR; INTRAVENOUS
Status: DISCONTINUED | OUTPATIENT
Start: 2022-11-02 | End: 2022-11-02

## 2022-11-02 RX ORDER — PHENYLEPHRINE HYDROCHLORIDE 10 MG/ML
INJECTION INTRAVENOUS
Status: DISCONTINUED | OUTPATIENT
Start: 2022-11-02 | End: 2022-11-02

## 2022-11-02 RX ORDER — TALC
6 POWDER (GRAM) TOPICAL NIGHTLY PRN
Status: DISCONTINUED | OUTPATIENT
Start: 2022-11-02 | End: 2022-11-05 | Stop reason: HOSPADM

## 2022-11-02 RX ORDER — FAMOTIDINE 20 MG/1
20 TABLET, FILM COATED ORAL 2 TIMES DAILY
Status: DISCONTINUED | OUTPATIENT
Start: 2022-11-02 | End: 2022-11-05 | Stop reason: HOSPADM

## 2022-11-02 RX ORDER — METOCLOPRAMIDE HYDROCHLORIDE 5 MG/ML
5 INJECTION INTRAMUSCULAR; INTRAVENOUS EVERY 6 HOURS PRN
Status: DISCONTINUED | OUTPATIENT
Start: 2022-11-02 | End: 2022-11-05 | Stop reason: HOSPADM

## 2022-11-02 RX ORDER — KETOROLAC TROMETHAMINE 30 MG/ML
INJECTION, SOLUTION INTRAMUSCULAR; INTRAVENOUS
Status: DISCONTINUED | OUTPATIENT
Start: 2022-11-02 | End: 2022-11-02

## 2022-11-02 RX ORDER — ONDANSETRON 4 MG/1
8 TABLET, ORALLY DISINTEGRATING ORAL EVERY 8 HOURS PRN
Status: DISCONTINUED | OUTPATIENT
Start: 2022-11-02 | End: 2022-11-05 | Stop reason: HOSPADM

## 2022-11-02 RX ORDER — FENTANYL CITRATE 50 UG/ML
25 INJECTION, SOLUTION INTRAMUSCULAR; INTRAVENOUS EVERY 5 MIN PRN
Status: CANCELLED | OUTPATIENT
Start: 2022-11-02

## 2022-11-02 RX ORDER — FENTANYL CITRATE 50 UG/ML
INJECTION, SOLUTION INTRAMUSCULAR; INTRAVENOUS
Status: DISCONTINUED | OUTPATIENT
Start: 2022-11-02 | End: 2022-11-02

## 2022-11-02 RX ORDER — HYDROMORPHONE HCL IN 0.9% NACL 6 MG/30 ML
PATIENT CONTROLLED ANALGESIA SYRINGE INTRAVENOUS CONTINUOUS
Status: DISCONTINUED | OUTPATIENT
Start: 2022-11-02 | End: 2022-11-05 | Stop reason: HOSPADM

## 2022-11-02 RX ORDER — NALOXONE HCL 0.4 MG/ML
0.02 VIAL (ML) INJECTION
Status: DISCONTINUED | OUTPATIENT
Start: 2022-11-02 | End: 2022-11-05 | Stop reason: HOSPADM

## 2022-11-02 RX ORDER — PROPOFOL 10 MG/ML
VIAL (ML) INTRAVENOUS
Status: DISCONTINUED | OUTPATIENT
Start: 2022-11-02 | End: 2022-11-02

## 2022-11-02 RX ORDER — SODIUM CHLORIDE 450 MG/100ML
75 INJECTION, SOLUTION INTRAVENOUS CONTINUOUS
Status: ACTIVE | OUTPATIENT
Start: 2022-11-02 | End: 2022-11-03

## 2022-11-02 RX ORDER — DOCUSATE SODIUM 100 MG/1
100 CAPSULE, LIQUID FILLED ORAL 2 TIMES DAILY
Status: DISCONTINUED | OUTPATIENT
Start: 2022-11-02 | End: 2022-11-05 | Stop reason: HOSPADM

## 2022-11-02 RX ORDER — SODIUM CHLORIDE 0.9 % (FLUSH) 0.9 %
10 SYRINGE (ML) INJECTION
Status: CANCELLED | OUTPATIENT
Start: 2022-11-02

## 2022-11-02 RX ORDER — ACETAMINOPHEN 325 MG/1
650 TABLET ORAL EVERY 4 HOURS PRN
Status: DISCONTINUED | OUTPATIENT
Start: 2022-11-02 | End: 2022-11-05 | Stop reason: HOSPADM

## 2022-11-02 RX ORDER — LOPERAMIDE HYDROCHLORIDE 2 MG/1
4 CAPSULE ORAL ONCE
Status: DISCONTINUED | OUTPATIENT
Start: 2022-11-02 | End: 2022-11-05 | Stop reason: HOSPADM

## 2022-11-02 RX ORDER — MUPIROCIN 20 MG/G
1 OINTMENT TOPICAL 2 TIMES DAILY
Status: DISPENSED | OUTPATIENT
Start: 2022-11-02 | End: 2022-11-04

## 2022-11-02 RX ORDER — GABAPENTIN 300 MG/1
300 CAPSULE ORAL ONCE
Status: DISCONTINUED | OUTPATIENT
Start: 2022-11-02 | End: 2022-11-05 | Stop reason: HOSPADM

## 2022-11-02 RX ORDER — ROCURONIUM BROMIDE 10 MG/ML
INJECTION, SOLUTION INTRAVENOUS
Status: DISCONTINUED | OUTPATIENT
Start: 2022-11-02 | End: 2022-11-02

## 2022-11-02 RX ORDER — ACETAMINOPHEN 10 MG/ML
INJECTION, SOLUTION INTRAVENOUS
Status: DISCONTINUED | OUTPATIENT
Start: 2022-11-02 | End: 2022-11-02

## 2022-11-02 RX ORDER — DEXAMETHASONE SODIUM PHOSPHATE 4 MG/ML
INJECTION, SOLUTION INTRA-ARTICULAR; INTRALESIONAL; INTRAMUSCULAR; INTRAVENOUS; SOFT TISSUE
Status: DISCONTINUED | OUTPATIENT
Start: 2022-11-02 | End: 2022-11-02

## 2022-11-02 RX ORDER — VANCOMYCIN HCL IN 5 % DEXTROSE 1G/250ML
1000 PLASTIC BAG, INJECTION (ML) INTRAVENOUS ONCE
Status: COMPLETED | OUTPATIENT
Start: 2022-11-02 | End: 2022-11-02

## 2022-11-02 RX ORDER — HYDROMORPHONE HYDROCHLORIDE 2 MG/ML
0.2 INJECTION, SOLUTION INTRAMUSCULAR; INTRAVENOUS; SUBCUTANEOUS EVERY 5 MIN PRN
Status: CANCELLED | OUTPATIENT
Start: 2022-11-02

## 2022-11-02 RX ADMIN — GLYCOPYRROLATE 0.2 MG: 0.2 INJECTION INTRAMUSCULAR; INTRAVENOUS at 09:11

## 2022-11-02 RX ADMIN — MIDAZOLAM HYDROCHLORIDE 2 MG: 1 INJECTION, SOLUTION INTRAMUSCULAR; INTRAVENOUS at 09:11

## 2022-11-02 RX ADMIN — Medication: at 01:11

## 2022-11-02 RX ADMIN — PHENYLEPHRINE HYDROCHLORIDE 100 MCG: 10 INJECTION INTRAVENOUS at 11:11

## 2022-11-02 RX ADMIN — VANCOMYCIN HYDROCHLORIDE 1000 MG: 1 INJECTION, POWDER, LYOPHILIZED, FOR SOLUTION INTRAVENOUS at 08:11

## 2022-11-02 RX ADMIN — GLYCOPYRROLATE 0.2 MG: 0.2 INJECTION INTRAMUSCULAR; INTRAVENOUS at 10:11

## 2022-11-02 RX ADMIN — SODIUM CHLORIDE 75 ML/HR: 4.5 INJECTION, SOLUTION INTRAVENOUS at 03:11

## 2022-11-02 RX ADMIN — PROPOFOL 120 MG: 10 INJECTION, EMULSION INTRAVENOUS at 09:11

## 2022-11-02 RX ADMIN — ROCURONIUM BROMIDE 15 MG: 10 INJECTION, SOLUTION INTRAVENOUS at 10:11

## 2022-11-02 RX ADMIN — FENTANYL CITRATE 100 MCG: 50 INJECTION, SOLUTION INTRAMUSCULAR; INTRAVENOUS at 09:11

## 2022-11-02 RX ADMIN — Medication: at 11:11

## 2022-11-02 RX ADMIN — OXYCODONE 5 MG: 5 TABLET ORAL at 08:11

## 2022-11-02 RX ADMIN — PHENYLEPHRINE HYDROCHLORIDE 100 MCG: 10 INJECTION INTRAVENOUS at 09:11

## 2022-11-02 RX ADMIN — ROCURONIUM BROMIDE 50 MG: 10 INJECTION, SOLUTION INTRAVENOUS at 09:11

## 2022-11-02 RX ADMIN — ROCURONIUM BROMIDE 20 MG: 10 INJECTION, SOLUTION INTRAVENOUS at 11:11

## 2022-11-02 RX ADMIN — PHENYLEPHRINE HYDROCHLORIDE 100 MCG: 10 INJECTION INTRAVENOUS at 10:11

## 2022-11-02 RX ADMIN — KETOROLAC TROMETHAMINE 15 MG: 30 INJECTION, SOLUTION INTRAMUSCULAR at 10:11

## 2022-11-02 RX ADMIN — KETOROLAC TROMETHAMINE 15 MG: 30 INJECTION, SOLUTION INTRAMUSCULAR at 06:11

## 2022-11-02 RX ADMIN — SUGAMMADEX 117 MG: 100 INJECTION, SOLUTION INTRAVENOUS at 12:11

## 2022-11-02 RX ADMIN — MUPIROCIN 1 G: 20 OINTMENT TOPICAL at 08:11

## 2022-11-02 RX ADMIN — SODIUM CHLORIDE, SODIUM GLUCONATE, SODIUM ACETATE, POTASSIUM CHLORIDE AND MAGNESIUM CHLORIDE: 526; 502; 368; 37; 30 INJECTION, SOLUTION INTRAVENOUS at 09:11

## 2022-11-02 RX ADMIN — HYDROMORPHONE HYDROCHLORIDE 1 MG: 2 INJECTION, SOLUTION INTRAMUSCULAR; INTRAVENOUS; SUBCUTANEOUS at 11:11

## 2022-11-02 RX ADMIN — HYDROMORPHONE HYDROCHLORIDE 1 MG: 2 INJECTION, SOLUTION INTRAMUSCULAR; INTRAVENOUS; SUBCUTANEOUS at 10:11

## 2022-11-02 RX ADMIN — VANCOMYCIN HYDROCHLORIDE 1000 MG: 1 INJECTION, POWDER, LYOPHILIZED, FOR SOLUTION INTRAVENOUS at 07:11

## 2022-11-02 RX ADMIN — KETOROLAC TROMETHAMINE 30 MG: 30 INJECTION, SOLUTION INTRAMUSCULAR at 12:11

## 2022-11-02 RX ADMIN — ACETAMINOPHEN 1000 MG: 10 INJECTION, SOLUTION INTRAVENOUS at 10:11

## 2022-11-02 RX ADMIN — ONDANSETRON 4 MG: 2 INJECTION INTRAMUSCULAR; INTRAVENOUS at 10:11

## 2022-11-02 RX ADMIN — DEXAMETHASONE SODIUM PHOSPHATE 4 MG: 4 INJECTION, SOLUTION INTRA-ARTICULAR; INTRALESIONAL; INTRAMUSCULAR; INTRAVENOUS; SOFT TISSUE at 10:11

## 2022-11-02 NOTE — ANESTHESIA POSTPROCEDURE EVALUATION
Anesthesia Post Evaluation    Patient: Cynthia Hanson    Procedure(s) Performed: Procedure(s) (LRB):  THORACOTOMY (Right)    Final Anesthesia Type: general      Patient location during evaluation: PACU  Patient participation: Yes- Able to Participate  Level of consciousness: awake and alert  Post-procedure vital signs: reviewed and stable  Pain management: adequate  Airway patency: patent    PONV status at discharge: No PONV  Anesthetic complications: no      Cardiovascular status: blood pressure returned to baseline  Respiratory status: spontaneous ventilation and unassisted  Hydration status: euvolemic  Follow-up needed           Vitals Value Taken Time   /62 11/02/22 1242   Temp 98 11/02/22 1248   Pulse 77 11/02/22 1248   Resp 14 11/02/22 1248   SpO2 100 % 11/02/22 1248   Vitals shown include unvalidated device data.      No case tracking events are documented in the log.      Pain/Rei Score: No data recorded

## 2022-11-02 NOTE — OP NOTE
Date of service 11/02/2022   Preop diagnosis:  Right upper lobe lung cancer   Postop diagnosis: Same  Procedure:  Right upper pulmonary lobectomy with mediastinal lymph node dissection  Surgeon:  Marilin  Assistant:  Junior  Anesthesia:  General endotracheal  Drains:  Chest tube x1     Procedure in detail:  The patient was taken to the operating room under informed consent placed on the table in a supine position.  General endotracheal anesthesia was induced by the anesthesia department.  He was rotated onto his left side all pressure points appropriately padded.  The right chest was prepped and draped in the usual sterile fashion.  A posterolateral thoracotomy incision made and carried down with electrocautery through the latissimus muscle.  The serratus muscle was spared.  The 4th interspace entered.  The mass identified in the upper lobe.  The middle lobe was free of disease.  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  Adhesions were taken down superiorly.  The pulmonary vein to the upper lobe was dissected and divided with a vascular stapler.  The minor fissure was completed with Endo staplers.  Next the pulmonary artery branches to the upper lobe were carefully dissected and then individually ligated and divided.  The bronchus was then transected with a TA 30° stapling device.  40 cm of pressure was held on the lung with the chest cavity filled with saline and no bronchial air leak was noted.  The mediastinum was entered and several large R4 lymph nodes were harvested and sent for permanent analysis.  Inferior pulmonary ligament and paraesophageal lymph nodes were taken and sent for permanent analysis.  A chest tube was brought in through the separate stab wound and secured to the skin.  The ribs closed with interrupted 2. Vicryl.  The muscle layer closed with a running 1. Vicryl.  The subcutaneous tissue with 2-0 Vicryl and the skin with a 3-0 subcuticular stitch.  The patient tolerated this  procedure well and left the operating room in stable condition.

## 2022-11-02 NOTE — TRANSFER OF CARE
"Anesthesia Transfer of Care Note    Patient: Cynthia Hanson    Procedure(s) Performed: Procedure(s) (LRB):  THORACOTOMY (Right)    Patient location: PACU    Anesthesia Type: general    Transport from OR: Transported from OR on room air with adequate spontaneous ventilation    Post pain: adequate analgesia    Post assessment: no apparent anesthetic complications and tolerated procedure well    Post vital signs: stable    Level of consciousness: sedated    Nausea/Vomiting: no nausea/vomiting    Complications: none    Transfer of care protocol was followed      Last vitals:   Visit Vitals  /62 (BP Location: Left arm, Patient Position: Lying)   Pulse 70   Temp 36.4 °C (97.6 °F) (Oral)   Resp 17   Ht 5' 5" (1.651 m)   Wt 58.7 kg (129 lb 6.6 oz)   SpO2 100%   Breastfeeding No   BMI 21.53 kg/m²     "

## 2022-11-02 NOTE — ANESTHESIA PREPROCEDURE EVALUATION
11/02/2022  Cynthia Hanson is a 62 y.o., female.      Pre-op Assessment    I have reviewed the Patient Summary Reports.     I have reviewed the Nursing Notes. I have reviewed the NPO Status.   I have reviewed the Medications.     Review of Systems  Anesthesia Hx:  No problems with previous Anesthesia    Social:  Smoker    Cardiovascular:   Denies Hypertension.  Denies MI.    Denies Angina.  Denies CHF.    Pulmonary:   Denies COPD.  Denies Asthma. Lung mass, right    Hx pneumothorax 3 weeks ago after lung biopsy, resolved with chest tube   Hepatic/GI:   GERD, well controlled Denies Liver Disease. Denies Hepatitis.    Neurological:   Denies CVA. Denies Seizures.    Endocrine:   Denies Diabetes. Denies Hypothyroidism. Denies Hyperthyroidism.  Denies Obesity / BMI > 30      Physical Exam  General: Well nourished, Cooperative, Alert and Oriented    Airway:  Mallampati: I   Mouth Opening: Normal  TM Distance: Normal  Tongue: Normal  Neck ROM: Normal ROM    Dental:  Edentulous        Anesthesia Plan  Type of Anesthesia, risks & benefits discussed:    Anesthesia Type: Gen ETT  Intra-op Monitoring Plan: Standard ASA Monitors  Induction:  IV  Airway Plan: Video and Direct  Informed Consent: Informed consent signed with the Patient and all parties understand the risks and agree with anesthesia plan.  All questions answered.   ASA Score: 3  Day of Surgery Review of History & Physical: H&P Update referred to the surgeon/provider.    Ready For Surgery From Anesthesia Perspective.     .

## 2022-11-02 NOTE — ANESTHESIA PROCEDURE NOTES
Intubation    Date/Time: 11/2/2022 9:47 AM  Performed by: Ugo Abbasi CRNA  Authorized by: Enoch Menendez DO     Intubation:     Induction:  Intravenous    Intubated:  Postinduction    Mask Ventilation:  Easy mask    Attempts:  1    Attempted By:  ENT    Method of Intubation:  Video laryngoscopy (ingram #3)    Blade:  Cunningham 2    Laryngeal View Grade: Grade I - full view of cords      Difficult Airway Encountered?: No      Complications:  None    Airway Device:  Double lumen tube left    Airway Device Size:  37F    Style/Cuff Inflation:  Cuffed    Inflation Amount (mL):  7    Tube secured:  31    Secured at:  The lips    Placement Verified By:  Capnometry, other (see comments) and Fiber optic visualization    Complicating Factors:  None    Findings Post-Intubation:  BS equal bilateral and atraumatic/condition of teeth unchanged

## 2022-11-03 PROCEDURE — 63600175 PHARM REV CODE 636 W HCPCS: Performed by: PHYSICIAN ASSISTANT

## 2022-11-03 PROCEDURE — 25000003 PHARM REV CODE 250

## 2022-11-03 PROCEDURE — 63600175 PHARM REV CODE 636 W HCPCS

## 2022-11-03 PROCEDURE — 25000003 PHARM REV CODE 250: Performed by: PHYSICIAN ASSISTANT

## 2022-11-03 PROCEDURE — 11000001 HC ACUTE MED/SURG PRIVATE ROOM

## 2022-11-03 RX ORDER — DIPHENHYDRAMINE HCL 12.5MG/5ML
12.5 ELIXIR ORAL EVERY 6 HOURS PRN
Status: DISCONTINUED | OUTPATIENT
Start: 2022-11-03 | End: 2022-11-05 | Stop reason: HOSPADM

## 2022-11-03 RX ADMIN — KETOROLAC TROMETHAMINE 15 MG: 30 INJECTION, SOLUTION INTRAMUSCULAR at 06:11

## 2022-11-03 RX ADMIN — KETOROLAC TROMETHAMINE 15 MG: 30 INJECTION, SOLUTION INTRAMUSCULAR at 11:11

## 2022-11-03 RX ADMIN — OXYCODONE 5 MG: 5 TABLET ORAL at 06:11

## 2022-11-03 RX ADMIN — OXYCODONE 5 MG: 5 TABLET ORAL at 05:11

## 2022-11-03 RX ADMIN — SODIUM CHLORIDE 75 ML/HR: 4.5 INJECTION, SOLUTION INTRAVENOUS at 06:11

## 2022-11-03 RX ADMIN — OXYCODONE 5 MG: 5 TABLET ORAL at 12:11

## 2022-11-03 RX ADMIN — VANCOMYCIN HYDROCHLORIDE 1000 MG: 1 INJECTION, POWDER, LYOPHILIZED, FOR SOLUTION INTRAVENOUS at 11:11

## 2022-11-03 RX ADMIN — MUPIROCIN 1 G: 20 OINTMENT TOPICAL at 09:11

## 2022-11-03 RX ADMIN — KETOROLAC TROMETHAMINE 15 MG: 30 INJECTION, SOLUTION INTRAMUSCULAR at 09:11

## 2022-11-03 RX ADMIN — ENOXAPARIN SODIUM 40 MG: 40 INJECTION SUBCUTANEOUS at 05:11

## 2022-11-03 RX ADMIN — KETOROLAC TROMETHAMINE 15 MG: 30 INJECTION, SOLUTION INTRAMUSCULAR at 05:11

## 2022-11-03 RX ADMIN — OXYCODONE 5 MG: 5 TABLET ORAL at 09:11

## 2022-11-03 RX ADMIN — FAMOTIDINE 20 MG: 20 TABLET, FILM COATED ORAL at 08:11

## 2022-11-03 RX ADMIN — Medication: at 06:11

## 2022-11-03 RX ADMIN — DOCUSATE SODIUM 100 MG: 100 CAPSULE, LIQUID FILLED ORAL at 08:11

## 2022-11-03 RX ADMIN — VANCOMYCIN HYDROCHLORIDE 1000 MG: 1 INJECTION, POWDER, LYOPHILIZED, FOR SOLUTION INTRAVENOUS at 08:11

## 2022-11-03 RX ADMIN — MUPIROCIN 1 G: 20 OINTMENT TOPICAL at 08:11

## 2022-11-03 NOTE — PROGRESS NOTES
"CT SURGERY PROGRESS NOTE  Cynthia Hanson  62 y.o.  1960    Patients Procedure: Procedure(s) (LRB):  THORACOTOMY (Right)    Subjective  Interval History:   No major acute events overnight, afebrile vitals stable at room air  However was unable to sleep due to pain not being controlled and PCA machine "beeping" every time  Patient's PCA would lockout to due the respiratory monitor in place  Patient states is not able to take deep breaths however no incentive spirometry was given  States voiding with strickland, no nausea or vomiting      Medication List  Infusions   sodium chloride 0.45% 75 mL/hr (11/03/22 0614)    custom IVPB builder      hydromorphone in 0.9 % NaCl 6 mg/30 ml       Scheduled   docusate sodium  100 mg Oral BID    enoxaparin  40 mg Subcutaneous Daily    famotidine  20 mg Oral BID    gabapentin  300 mg Oral Once    ketorolac  15 mg Intravenous QID    loperamide  4 mg Oral Once    mupirocin  1 g Nasal BID    vancomycin (VANCOCIN) IVPB  1,000 mg Intravenous Q12H       Objective:  Recent Vitals:  Temp:  [97.5 °F (36.4 °C)-98.8 °F (37.1 °C)] 98.5 °F (36.9 °C)  Pulse:  [67-89] 80  Resp:  [11-20] 12  SpO2:  [87 %-100 %] 98 %  BP: ()/(53-73) 102/61    Physical Exam  Cardiovascular:      Rate and Rhythm: Normal rate.   Pulmonary:      Effort: Pulmonary effort is normal.      Breath sounds: No stridor. No rhonchi.      Comments:   Chest tube 120 SS no air leak and to suction    Chest:      Chest wall: Tenderness present.   Abdominal:      Tenderness: There is no abdominal tenderness. There is no guarding.   Musculoskeletal:         General: No tenderness.      Right lower leg: No edema.   Skin:     Coloration: Skin is not jaundiced.   Neurological:      General: No focal deficit present.      Mental Status: She is alert.        I/O last 24 hrs:  Intake/Output - Last 3 Shifts         11/01 0700  11/02 0659 11/02 0700  11/03 0659    IV Piggyback  1100    Total Intake(mL/kg)  1100 (18.7)    " Urine (mL/kg/hr)  700 (0.5)    Other  82    Blood  100    Chest Tube  82    Total Output  964    Net  +136                  Labs  CBC:   Recent Labs   Lab 11/02/22  1030   WBC 18.6*   RBC 4.20   HGB 13.0   HCT 39.8      MCV 94.8*   MCH 31.0   MCHC 32.7*         Imaging:   CXR: X-Ray Chest 1 View  Today with no obvious pneumothorax and chest tube in place        ASSESSMENT/PLAN:  62 year old female with right upper lobe lung cancer who is POD 1  From right upper lobectomy with lymph node dissection    - encouraged patient to take deep breaths with incentive spirometry  - will add higher dose of oxycodone  - continue PCA pump  - regular diet  - remove strickland today   - encourage ambulation  - DVT ppx    Francesca Carter MD  General Surgery     As above, doing well  Pain control is main issue  D/c ct

## 2022-11-03 NOTE — PLAN OF CARE
Problem: Adult Inpatient Plan of Care  Goal: Plan of Care Review  Outcome: Ongoing, Progressing  Goal: Patient-Specific Goal (Individualized)  Outcome: Ongoing, Progressing  Goal: Absence of Hospital-Acquired Illness or Injury  Outcome: Ongoing, Progressing  Goal: Optimal Comfort and Wellbeing  Outcome: Ongoing, Progressing  Goal: Readiness for Transition of Care  Outcome: Ongoing, Progressing     Problem: Infection  Goal: Absence of Infection Signs and Symptoms  Outcome: Ongoing, Progressing     Problem: Pain Acute  Goal: Acceptable Pain Control and Functional Ability  Outcome: Ongoing, Progressing     Problem: Fall Injury Risk  Goal: Absence of Fall and Fall-Related Injury  Outcome: Ongoing, Progressing     Problem: Breathing Pattern Ineffective  Goal: Effective Breathing Pattern  Outcome: Ongoing, Progressing

## 2022-11-04 PROCEDURE — 63600175 PHARM REV CODE 636 W HCPCS: Performed by: PHYSICIAN ASSISTANT

## 2022-11-04 PROCEDURE — 11000001 HC ACUTE MED/SURG PRIVATE ROOM

## 2022-11-04 PROCEDURE — 25000003 PHARM REV CODE 250

## 2022-11-04 PROCEDURE — 25000003 PHARM REV CODE 250: Performed by: PHYSICIAN ASSISTANT

## 2022-11-04 PROCEDURE — 63600175 PHARM REV CODE 636 W HCPCS

## 2022-11-04 RX ADMIN — OXYCODONE 5 MG: 5 TABLET ORAL at 09:11

## 2022-11-04 RX ADMIN — DOCUSATE SODIUM 100 MG: 100 CAPSULE, LIQUID FILLED ORAL at 08:11

## 2022-11-04 RX ADMIN — FAMOTIDINE 20 MG: 20 TABLET, FILM COATED ORAL at 08:11

## 2022-11-04 RX ADMIN — Medication: at 06:11

## 2022-11-04 RX ADMIN — DIPHENHYDRAMINE HYDROCHLORIDE 12.5 MG: 25 SOLUTION ORAL at 09:11

## 2022-11-04 RX ADMIN — OXYCODONE 5 MG: 5 TABLET ORAL at 10:11

## 2022-11-04 RX ADMIN — FAMOTIDINE 20 MG: 20 TABLET, FILM COATED ORAL at 09:11

## 2022-11-04 RX ADMIN — Medication 6 MG: at 09:11

## 2022-11-04 RX ADMIN — DOCUSATE SODIUM 100 MG: 100 CAPSULE, LIQUID FILLED ORAL at 09:11

## 2022-11-04 RX ADMIN — KETOROLAC TROMETHAMINE 15 MG: 30 INJECTION, SOLUTION INTRAMUSCULAR at 06:11

## 2022-11-04 RX ADMIN — ENOXAPARIN SODIUM 40 MG: 40 INJECTION SUBCUTANEOUS at 05:11

## 2022-11-04 NOTE — PROGRESS NOTES
CT SURGERY PROGRESS NOTE  Cynthia Hanson  62 y.o.  1960    Patients Procedure: Procedure(s) (LRB):  THORACOTOMY (Right)  LOBECTOMY, LUNG    Subjective  Interval History:   No major acute events overnight  Afebrile nontachycardic BP stable although baseline is 90s-100s systolics  States pain is much more controlled, was able to sleep  Has been voiding after strickland removed  Tolerating diet but has not eaten much because hospital food is not appetizing  Has been ambulated to restroom    Medication List  Infusions   custom IVPB builder      hydromorphone in 0.9 % NaCl 6 mg/30 ml       Scheduled   docusate sodium  100 mg Oral BID    enoxaparin  40 mg Subcutaneous Daily    famotidine  20 mg Oral BID    gabapentin  300 mg Oral Once    ketorolac  15 mg Intravenous QID    loperamide  4 mg Oral Once    mupirocin  1 g Nasal BID    vancomycin (VANCOCIN) IVPB  1,000 mg Intravenous Q12H       Objective:  Recent Vitals:  Temp:  [98 °F (36.7 °C)-99.1 °F (37.3 °C)] 99.1 °F (37.3 °C)  Pulse:  [75-82] 77  Resp:  [10-18] 18  SpO2:  [94 %-98 %] 95 %  BP: ()/(53-58) 101/53    Physical Exam  Constitutional:       General: She is not in acute distress.  HENT:      Head: Normocephalic.   Cardiovascular:      Rate and Rhythm: Normal rate.   Pulmonary:      Effort: No respiratory distress.      Comments: Chest tube with 120 SS drainage  No air leak seen upon coughing or at rest, it was on suction but I  Placed to waterseal  Abdominal:      General: There is no distension.      Tenderness: There is no abdominal tenderness.   Skin:     General: Skin is warm.   Neurological:      General: No focal deficit present.        I/O last 24 hrs:  Intake/Output - Last 3 Shifts         11/02 0700  11/03 0659 11/03 0700  11/04 0659 11/04 0700  11/05 0659    P.O.  480     IV Piggyback 1100      Total Intake(mL/kg) 1100 (18.7) 480 (8.2)     Urine (mL/kg/hr) 700 (0.5) 2050 (1.5)     Other 82      Blood 100      Chest Tube 82 118      Total Output 964 2168     Net +136 -7288                    Labs  CBC:   Recent Labs   Lab 11/02/22  1030   WBC 18.6*   RBC 4.20   HGB 13.0   HCT 39.8      MCV 94.8*   MCH 31.0   MCHC 32.7*         Imaging:   Chest x ray with no obvious pneumothorax  Chest tube in place      ASSESSMENT/PLAN:  62 year old female with right upper lobe lung cancer who is POD 2  From right upper lobectomy with lymph node dissection     - encouraged patient to take deep breaths with incentive spirometry  - possible dc chest tube today and dc pca pump with increased orals if needed  - regular diet and bowel regimen  - encourage ambulation  - DVT ppx     Francesca Carter MD  General Surgery

## 2022-11-04 NOTE — PLAN OF CARE
11/04/22 1406   Discharge Assessment   Assessment Type Discharge Planning Assessment   Confirmed/corrected address, phone number and insurance Yes   Source of Information patient   Lives With spouse   Do you expect to return to your current living situation? Yes   Do you have help at home or someone to help you manage your care at home? Yes   Current cognitive status: Alert/Oriented   Walking or Climbing Stairs Difficulty none   Dressing/Bathing Difficulty none   Readmission within 30 days? No   Patient currently being followed by outpatient case management? No   Do you currently have service(s) that help you manage your care at home? No   Do you take prescription medications? Yes   Do you have prescription coverage? Yes   Do you have any problems affording any of your prescribed medications? No   Discharge Plan A Home Health   Discharge Plan B Home Health   DME Needed Upon Discharge  none   Discharge Barriers Identified None

## 2022-11-05 VITALS
BODY MASS INDEX: 21.56 KG/M2 | DIASTOLIC BLOOD PRESSURE: 57 MMHG | HEART RATE: 81 BPM | RESPIRATION RATE: 15 BRPM | WEIGHT: 129.44 LBS | OXYGEN SATURATION: 97 % | TEMPERATURE: 98 F | SYSTOLIC BLOOD PRESSURE: 97 MMHG | HEIGHT: 65 IN

## 2022-11-05 PROCEDURE — 99024 POSTOP FOLLOW-UP VISIT: CPT | Mod: ,,, | Performed by: PHYSICIAN ASSISTANT

## 2022-11-05 PROCEDURE — 99024 PR POST-OP FOLLOW-UP VISIT: ICD-10-PCS | Mod: ,,, | Performed by: PHYSICIAN ASSISTANT

## 2022-11-05 PROCEDURE — 25000003 PHARM REV CODE 250

## 2022-11-05 RX ORDER — OXYCODONE HYDROCHLORIDE 5 MG/1
5 TABLET ORAL EVERY 4 HOURS PRN
Qty: 30 TABLET | Refills: 0 | Status: SHIPPED | OUTPATIENT
Start: 2022-11-05 | End: 2022-12-02

## 2022-11-05 RX ADMIN — DOCUSATE SODIUM 100 MG: 100 CAPSULE, LIQUID FILLED ORAL at 08:11

## 2022-11-05 RX ADMIN — FAMOTIDINE 20 MG: 20 TABLET, FILM COATED ORAL at 08:11

## 2022-11-05 RX ADMIN — OXYCODONE 5 MG: 5 TABLET ORAL at 08:11

## 2022-11-05 NOTE — DISCHARGE SUMMARY
[facility] - [facility]  Cardiothoracic Surgery  Discharge Summary      Patient Name: Cynthia Hanson  MRN: [id number]  Admission Date: 11/2/2022  Hospital Length of Stay: 3 days  Discharge Date and Time:  11/05/2022 7:16 AM  Attending Physician: Topher Gaston IV, MD   Discharging Provider: SHARYN Manzano  Primary Care Provider: Silvana Maciel MD    HPI:   No notes on file    Procedure(s) (LRB):  THORACOTOMY (Right)  LOBECTOMY, LUNG      Indwelling Lines/Drains at time of discharge:   Lines/Drains/Airways     Drain  Duration                Chest Tube 11/02/22 1202 Right Midaxillary 2 days              Hospital Course: No notes on file    Goals of Care Treatment Preferences:  Code Status: Full Code          Significant Diagnostic Studies: Labs: All labs within the past 24 hours have been reviewed    Pending Diagnostic Studies:     Procedure Component Value Units Date/Time    Basic metabolic panel [[id number]]     Order Status: Sent Lab Status: No result     Specimen: Blood     Basic metabolic panel [[id number]]     Order Status: Sent Lab Status: No result     Specimen: Blood     CBC Auto Differential [[id number]]     Order Status: Sent Lab Status: No result     Specimen: Blood     Narrative:      The following orders were created for panel order CBC Auto Differential.  Procedure                               Abnormality         Status                     ---------                               -----------         ------                     CBC with Differential[[id number]]                                                         Please view results for these tests on the individual orders.    CBC with Differential [[id number]]     Order Status: Sent Lab Status: No result     Specimen: Blood     Specimen to Pathology [[id number]] Collected: 11/02/22 1113    Order Status: Sent Lab Status: In process Updated: 11/02/22 1447    Specimen: Tissue from Fissure, fistula; Tissue from  Lymph Node; Tissue from Lung, RUL; Tissue from Lymph node 4R; Tissue from Lymph Node; Tissue from Lymph Node     X-Ray Chest AP Single View [[id number]] Resulted: 11/05/22 0537    Order Status: Sent Lab Status: In process Updated: 11/05/22 0604          No new Assessment & Plan notes have been filed under this hospital service since the last note was generated.  Service: Cardiothoracic Surgery    Final Active Diagnoses:    Diagnosis Date Noted POA    PRINCIPAL PROBLEM:  Lung mass [R91.8] 10/10/2022 Yes      Problems Resolved During this Admission:      Discharged Condition: good    Disposition: Home or Self Care    Follow Up:   Contact information for after-discharge care     Home Medical Care     Mar[facility]le [facility]    Service: Home Health Services  Contact information:  [address]  [place] [address]  [phone]                           Patient Instructions:      HOME HEALTH ORDERS     Order Specific Question Answer Comments   What Home Health Agency is the patient currently using? Other/External      Medications:  Reconciled Home Medications:      Medication List      START taking these medications    oxyCODONE 5 MG immediate release tablet  Commonly known as: ROXICODONE  Take 1 tablet (5 mg total) by mouth every 4 (four) hours as needed for Pain.        CONTINUE taking these medications    b complex vitamins tablet  Take 1 tablet by mouth once daily.     ECHINACEA 125 mg Tab  Generic drug: echinacea purpurea extract  Take 1 tablet by mouth once daily.     famotidine 20 MG tablet  Commonly known as: PEPCID  Take 20 mg by mouth once daily.     loratadine 10 mg tablet  Commonly known as: CLARITIN  Take 10 mg by mouth once daily.     vitamin D 1000 units Tab  Commonly known as: VITAMIN D3  Take 1,000 Units by mouth once daily.          Time spent on the discharge of patient: 22 minutes    SHARYN Manzano  Cardiothoracic Surgery  [facility]

## 2022-11-05 NOTE — PLAN OF CARE
Problem: Adult Inpatient Plan of Care  Goal: Plan of Care Review  Outcome: Met  Goal: Patient-Specific Goal (Individualized)  Outcome: Met  Goal: Absence of Hospital-Acquired Illness or Injury  Outcome: Met  Goal: Optimal Comfort and Wellbeing  Outcome: Met  Goal: Readiness for Transition of Care  Outcome: Met     Problem: Infection  Goal: Absence of Infection Signs and Symptoms  Outcome: Met     Problem: Pain Acute  Goal: Acceptable Pain Control and Functional Ability  Outcome: Met     Problem: Fall Injury Risk  Goal: Absence of Fall and Fall-Related Injury  Outcome: Met     Problem: Breathing Pattern Ineffective  Goal: Effective Breathing Pattern  Outcome: Met

## 2022-11-07 ENCOUNTER — PATIENT MESSAGE (OUTPATIENT)
Dept: CARDIAC SURGERY | Facility: CLINIC | Age: 62
End: 2022-11-07
Payer: COMMERCIAL

## 2022-11-07 NOTE — PLAN OF CARE
11/07/22 0817   Final Note   Assessment Type Final Discharge Note   Anticipated Discharge Disposition Home-Health   Post-Acute Status   Post-Acute Authorization Home Health   Home Health Status Set-up Complete/Auth obtained  (Marito)

## 2022-11-08 PROCEDURE — G0180 PR HOME HEALTH MD CERTIFICATION: ICD-10-PCS | Mod: ,,, | Performed by: SPECIALIST

## 2022-11-08 PROCEDURE — G0180 MD CERTIFICATION HHA PATIENT: HCPCS | Mod: ,,, | Performed by: SPECIALIST

## 2022-11-22 ENCOUNTER — OFFICE VISIT (OUTPATIENT)
Dept: CARDIAC SURGERY | Facility: CLINIC | Age: 62
End: 2022-11-22
Payer: COMMERCIAL

## 2022-11-22 VITALS
DIASTOLIC BLOOD PRESSURE: 68 MMHG | BODY MASS INDEX: 23.04 KG/M2 | SYSTOLIC BLOOD PRESSURE: 120 MMHG | HEART RATE: 93 BPM | WEIGHT: 130 LBS | HEIGHT: 63 IN

## 2022-11-22 DIAGNOSIS — C34.11 MALIGNANT NEOPLASM OF UPPER LOBE OF RIGHT LUNG: Primary | ICD-10-CM

## 2022-11-22 PROCEDURE — 1111F DSCHRG MED/CURRENT MED MERGE: CPT | Mod: CPTII,,, | Performed by: SPECIALIST

## 2022-11-22 PROCEDURE — 1159F MED LIST DOCD IN RCRD: CPT | Mod: CPTII,,, | Performed by: SPECIALIST

## 2022-11-22 PROCEDURE — 3008F BODY MASS INDEX DOCD: CPT | Mod: CPTII,,, | Performed by: SPECIALIST

## 2022-11-22 PROCEDURE — 99024 POSTOP FOLLOW-UP VISIT: CPT | Mod: ,,, | Performed by: SPECIALIST

## 2022-11-22 PROCEDURE — 3074F PR MOST RECENT SYSTOLIC BLOOD PRESSURE < 130 MM HG: ICD-10-PCS | Mod: CPTII,,, | Performed by: SPECIALIST

## 2022-11-22 PROCEDURE — 3078F DIAST BP <80 MM HG: CPT | Mod: CPTII,,, | Performed by: SPECIALIST

## 2022-11-22 PROCEDURE — 3074F SYST BP LT 130 MM HG: CPT | Mod: CPTII,,, | Performed by: SPECIALIST

## 2022-11-22 PROCEDURE — 99024 PR POST-OP FOLLOW-UP VISIT: ICD-10-PCS | Mod: ,,, | Performed by: SPECIALIST

## 2022-11-22 PROCEDURE — 3008F PR BODY MASS INDEX (BMI) DOCUMENTED: ICD-10-PCS | Mod: CPTII,,, | Performed by: SPECIALIST

## 2022-11-22 PROCEDURE — 1159F PR MEDICATION LIST DOCUMENTED IN MEDICAL RECORD: ICD-10-PCS | Mod: CPTII,,, | Performed by: SPECIALIST

## 2022-11-22 PROCEDURE — 3078F PR MOST RECENT DIASTOLIC BLOOD PRESSURE < 80 MM HG: ICD-10-PCS | Mod: CPTII,,, | Performed by: SPECIALIST

## 2022-11-22 PROCEDURE — 1111F PR DISCHARGE MEDS RECONCILED W/ CURRENT OUTPATIENT MED LIST: ICD-10-PCS | Mod: CPTII,,, | Performed by: SPECIALIST

## 2022-11-22 NOTE — PROGRESS NOTES
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  She has no shortness of breath.  She is walking around well.    She gets some posterior muscle soreness during the day but no pain over her incision at this point.  Her incision has healed nicely.  Overall she is doing very well   We will get her a referral to Oncology for evaluation given her tumor size and poorly differentiated component

## 2022-12-02 ENCOUNTER — OFFICE VISIT (OUTPATIENT)
Dept: HEMATOLOGY/ONCOLOGY | Facility: CLINIC | Age: 62
End: 2022-12-02
Payer: COMMERCIAL

## 2022-12-02 VITALS
HEART RATE: 85 BPM | RESPIRATION RATE: 18 BRPM | SYSTOLIC BLOOD PRESSURE: 143 MMHG | OXYGEN SATURATION: 96 % | BODY MASS INDEX: 21.64 KG/M2 | TEMPERATURE: 99 F | WEIGHT: 129.88 LBS | DIASTOLIC BLOOD PRESSURE: 77 MMHG | HEIGHT: 65 IN

## 2022-12-02 DIAGNOSIS — R91.8 LUNG MASS: Primary | ICD-10-CM

## 2022-12-02 DIAGNOSIS — C34.11 MALIGNANT NEOPLASM OF UPPER LOBE OF RIGHT LUNG: ICD-10-CM

## 2022-12-02 PROCEDURE — 3077F PR MOST RECENT SYSTOLIC BLOOD PRESSURE >= 140 MM HG: ICD-10-PCS | Mod: CPTII,,, | Performed by: INTERNAL MEDICINE

## 2022-12-02 PROCEDURE — 1159F MED LIST DOCD IN RCRD: CPT | Mod: CPTII,,, | Performed by: INTERNAL MEDICINE

## 2022-12-02 PROCEDURE — 1160F RVW MEDS BY RX/DR IN RCRD: CPT | Mod: CPTII,,, | Performed by: INTERNAL MEDICINE

## 2022-12-02 PROCEDURE — 1159F PR MEDICATION LIST DOCUMENTED IN MEDICAL RECORD: ICD-10-PCS | Mod: CPTII,,, | Performed by: INTERNAL MEDICINE

## 2022-12-02 PROCEDURE — 3077F SYST BP >= 140 MM HG: CPT | Mod: CPTII,,, | Performed by: INTERNAL MEDICINE

## 2022-12-02 PROCEDURE — 1160F PR REVIEW ALL MEDS BY PRESCRIBER/CLIN PHARMACIST DOCUMENTED: ICD-10-PCS | Mod: CPTII,,, | Performed by: INTERNAL MEDICINE

## 2022-12-02 PROCEDURE — 3008F BODY MASS INDEX DOCD: CPT | Mod: CPTII,,, | Performed by: INTERNAL MEDICINE

## 2022-12-02 PROCEDURE — 3078F DIAST BP <80 MM HG: CPT | Mod: CPTII,,, | Performed by: INTERNAL MEDICINE

## 2022-12-02 PROCEDURE — 3008F PR BODY MASS INDEX (BMI) DOCUMENTED: ICD-10-PCS | Mod: CPTII,,, | Performed by: INTERNAL MEDICINE

## 2022-12-02 PROCEDURE — 99205 PR OFFICE/OUTPT VISIT, NEW, LEVL V, 60-74 MIN: ICD-10-PCS | Mod: ,,, | Performed by: INTERNAL MEDICINE

## 2022-12-02 PROCEDURE — 1111F PR DISCHARGE MEDS RECONCILED W/ CURRENT OUTPATIENT MED LIST: ICD-10-PCS | Mod: CPTII,,, | Performed by: INTERNAL MEDICINE

## 2022-12-02 PROCEDURE — 3078F PR MOST RECENT DIASTOLIC BLOOD PRESSURE < 80 MM HG: ICD-10-PCS | Mod: CPTII,,, | Performed by: INTERNAL MEDICINE

## 2022-12-02 PROCEDURE — 1111F DSCHRG MED/CURRENT MED MERGE: CPT | Mod: CPTII,,, | Performed by: INTERNAL MEDICINE

## 2022-12-02 PROCEDURE — 99205 OFFICE O/P NEW HI 60 MIN: CPT | Mod: ,,, | Performed by: INTERNAL MEDICINE

## 2022-12-02 RX ORDER — PHENYLPROPANOLAMINE/CLEMASTINE 75-1.34MG
200 TABLET, EXTENDED RELEASE ORAL EVERY 6 HOURS
COMMUNITY
Start: 2022-11-07 | End: 2023-02-22

## 2022-12-02 NOTE — PROGRESS NOTES
CONSULT NOTE  DATE:  2022  PROBLEM:  Non-small cell bronchogenic carcinoma  HxPI:  62-year-old  female found to have on routine CT screen to have mass involving right upper lung field proven to be bronchogenic carcinoma, adenocarcinoma subtype.  Staging workup revealed no evidence of extrathoracic disease.  Patient underwent thoracotomy and right upper lobectomy on 2022.  And final path revealed a 3.5 x 2.5 x 1.2 cm adenocarcinoma that was poorly differentiated.  There was no evidence of lymph node involvement.  Margins were free of residual disease.  Final stage was 1B.  Patient referred to our service for recommendations regarding possible adjuvant treatment.  SOCIAL Hx:  .  Had 4 children, but 1  from motor vehicle accident.  Patient is a corporate .  CHILDHOOD Hx:  Negative for rheumatic fever.  Patient usual childhood illnesses.  FAMILY Hx:  Negative for malignancies or hematological disorders.  Family history positive for various relatives with AODM, heart disease, visual impairment.  ALLERGIES:  PCN  HABITS:  30+ pack year smoking history.  Patient was cutting down and is consuming less than 1/4 pack of cigarettes per day at the time of this evaluation postoperatively.  ADULT MEDICAL PROBLEMS:  No long-term major medical problems  SURGICAL Hx:  Status post right upper lobectomy, prior bronchoscopy,  section x 2  MEDICATIONS:  Claritin, Pepcid.  ROS:  -GENERAL:  Patient denies fevers, chills, weight loss.  -HEENT:  No recent change in vision, hearing, taste or smell.  -LUNGS:  Patient denies cough, sputum production, hemoptysis, or shortness of breath.  -COR:  Patient denies chest pain or palpitations.  -BREAST/CHEST WAll:  Patient denies breast masses or chest wall abnormalities.  -GI:.  Patient denies nausea, vomiting, abdominal pain, black or bloody bowel movements, change in bowel habits.  -:  Patient denies dysuria, hematuria, or lower tract  obstructive symptomatology  -DERM:  Patient denies skin lesions.  -LYMPH:  Patient denies palpable peripheral lymphadenopathy.  -MSK:  Patient denies muscle bone or joint problems.  -EXT:  Patient denies clubbing, cyanosis, or edema  -ENDOCRINE:  Patient denies skin lesions.  -NEURO:  Patient denies focal neurological abnormalities.  -PSYCH:  Patient denies problems with depression or anxiety.    OBSERVATIONS:  -GENERAL:  Alert, oriented, appears comfortable at rest.  -VS:  Afebrile.  143/77  -HEENT:  PERRLA.  EOMI.  Nasal/oropharynx benign.  -NECK:  Supple, full range of motion, without bruits or thyromegaly.  -LUNGS:  Clear to auscultation and percussion  -BACK:  Without spinal or CVA tenderness  -COR:  Regular rate rhythm without murmurs, rubs, heaves.  -BREAST/CHEST WAll:  Normal breasts/chest wall region without palpable masses  -ABD:  Soft, nontender, positive bowel sounds without masses or visceromegaly.  -DERM:  No significant  palpable or visible cutaneous lesions noted.  -LYMPH:  No palpable peripheral lymphadenopathy.  -EXT:  Without clubbing, cyanosis, or edema.  -NEURO:  Cranial nerves 2-12 grossly intact.  No evidence of motor or sensory dysfunction.  -PSYCH:  No features suggesting depression or anxiety.  ASSESSMENT:  Diagnosis of stage I non-small cell bronchogenic carcinoma originating in RUL status post successful lobectomy for curative intent.  PLAN:  NCCN guidelines currently suggest that immunotherapy (PD-L1 Rx) with Tagrisso may be of benefit.  Patient given literature to review.  In the meantime, does not appear that the pathology service sent off any of the tumor specimen for marker studies.  It might not be pertinent for the time being but if and when the patient never relapses in the future would be good to know whether or not there are molecular targets that can be utilized for additional management.  We will request lung cancer marker panel including PD-L1 assay.  Patient will review  literature concerning Tagrisso (osimertinib) and let us know by TeleMed conferences she wants to consider this Rx.    FEDERICO FORD M.D., FACP

## 2022-12-09 NOTE — PROGRESS NOTES
Established Patient - Audio Only Telehealth Visit     The patient location is: Home   The chief complaint leading to consultation is: follow up   Visit type: Virtual visit with audio only (telephone)  Total time spent with patient: 25       The reason for the audio only service rather than synchronous audio and video virtual visit was related to technical difficulties or patient preference/necessity.     Each patient to whom I provide medical services by telemedicine is:  (1) informed of the relationship between the physician and patient and the respective role of any other health care provider with respect to management of the patient; and (2) notified that they may decline to receive medical services by telemedicine and may withdraw from such care at any time. Patient verbally consented to receive this service via voice-only telephone call.         PROBLEM:  - Non-small cell bronchogenic carcinoma    HxPI:  62-year-old  female found to have on routine CT screen to have mass involving right upper lung field proven to be bronchogenic carcinoma, adenocarcinoma subtype.  Staging workup revealed no evidence of extrathoracic disease.  Patient underwent thoracotomy and right upper lobectomy on 2022.  And final path revealed a 3.5 x 2.5 x 1.2 cm adenocarcinoma that was poorly differentiated.  There was no evidence of lymph node involvement.  Margins were free of residual disease.  Final stage was 1B.  Patient referred to our service for recommendations regarding possible adjuvant treatment.    SOCIAL Hx:  .  Had 4 children, but 1  from motor vehicle accident.  Patient is a corporate .  CHILDHOOD Hx:  Negative for rheumatic fever.  Patient usual childhood illnesses.  FAMILY Hx:  Negative for malignancies or hematological disorders.  Family history positive for various relatives with AODM, heart disease, visual impairment.  ALLERGIES:  PCN  HABITS:  30+ pack year smoking  history.  Patient was cutting down and is consuming less than 1/4 pack of cigarettes per day at the time of this evaluation postoperatively.  ADULT MEDICAL PROBLEMS:  No long-term major medical problems  SURGICAL Hx:  Status post right upper lobectomy, prior bronchoscopy,  section x 2  MEDICATIONS:  Claritin, Pepcid.    Review of Systems   Respiratory:  Negative for cough and shortness of breath.    Cardiovascular:  Negative for chest pain.   Gastrointestinal:  Negative for abdominal pain and diarrhea.   Genitourinary:  Negative for frequency.   Musculoskeletal:  Negative for back pain.   Skin:  Negative for rash.   Neurological:  Negative for headaches.   Psychiatric/Behavioral:  The patient is not nervous/anxious.          LABS / IMAGING:       - 9/15/2022 PET: Right upper lung lobe mass opacity with extensive spiculations and avid hypermetabolism is of high concern for primary lung carcinoma. . No metastatic findings.       PATHOLOGY:   INTERFISSURE LYMPH NODE, LYMPHADENECTOMY:     ONE REACTIVE LYMPH NODE, NO NEOPLASM (0/1).     2.  HILAR LYMPH NODE, LYMPHADENECTOMY:     THREE LYMPH NODES, NO NEOPLASM (0/3).     3.  RIGHT UPPER LOBE, LOBECTOMY:     POORLY DIFFERENTIATED PULMONARY ADENOCARCINOMA. SEE COMMENT.     -- GREATEST TUMOR DIMENSION, 3.5 CM.  -- LYMPHOVASCULAR INVASION, NOT IDENTIFIED.  -- INVASION OF PLEURA, NOT IDENTIFIED.   -- SPREAD TO ADJACENT AIR SPACES, FOCALLY PRESENT.  -- BRONCHIAL, VASCULAR AND PARENCHYMAL MARGINS, UNINVOLVED.     4.  LYMPH NODE 4R, LYMPHADENECTOMY:     TWO LYMPH NODES, NO NEOPLASM (0/2).     5.  INFERIOR PULMONARY LIGAMENT, LYMPH NODE, LYMPHADENECTOMY:     ONE LYMPH NODE, NO NEOPLASM (0/1).     6.  PARAESOPHAGEAL LYMPH NODE, LYMPHADENECTOMY:     ONE LYMPH NODE, NO NEOPLASM (0/1).     CODE 9      SYNOPTIC REPORT  SPECIMEN        Procedure: Lobectomy        Specimen Laterality: Right     TUMOR        Tumor Focality: Single focus        Tumor Site: Upper lobe of lung      Tumor Size        Total Tumor Size (size of entire tumor): Greatest dimension in Centimeters (cm): 3.5 cm     TUMOR        Histologic Type: Invasive solid adenocarcinoma        Histologic Grade: G3, poorly differentiated        Spread Through Air Spaces (MONICA): Present        Visceral Pleura Invasion: Not identified        Direct Invasion of Adjacent Structures: Not applicable (no adjacent structures present)        Treatment Effect: No known presurgical therapy        Lymphovascular Invasion: Not identified     MARGINS        Margin Status for Invasive Carcinoma: All margins negative for invasive carcinoma        Closest Margin(s) to Invasive Carcinoma: Bronchial        Distance from Invasive Carcinoma to Closest Margin: Greater than: 5 cm        Margin Status for Non-Invasive Tumor: All margins negative for non-invasive tumor     REGIONAL LYMPH NODES        Lymph Node(s) from Prior Procedures: No known prior lymph node sampling performed        Regional Lymph Node Status: Regional lymph nodes present             All regional lymph nodes negative for tumor        Number of Lymph Nodes Examined: Exact number : 8        Saray Site(s) Examined: 4R: Lower paratracheal             8R: Para-esophageal (below ursula)             9R: Pulmonary ligament             10R: Hilar             11R: Interlobar     PATHOLOGIC STAGE CLASSIFICATION (pTNM, AJCC 8th Edition)        pT Category: pT2a: Tumor greater than 3 cm, but less than or equal to 4 cm in greatest dimension        pN Category: pN0: No regional lymph node metastasis     ADDITIONAL FINDINGS        Additional Findings: None identified    Assessment:     NSCLC / poorly differentiated adenocarcinoma; Stage IB pT2a pN0 (0/8)   - s/p RUL lobectomy 11/2022      Plan:     - pending NGS panel for PDL1 status and hormone mutation changes. Results not being available today so will reschedule appointment in 1-2 weeks to follow up on the NGS panel for further recommendation  regarding targeted therapy or discuss option of adjuvant chemotherapy.  High-risk features include only poorly differentiated adenocarcinoma, thus can discuss adjuvant chemotherapy option with the patient.   - Repeat labs on follow up: CBC, CMP, Mg     - MD / LABS VISIT - 2 WEEKS     The patient was seen, interviewed and examined. Pertinent lab and radiology studies were reviewed. Pt instructed to call should develop concerning signs/symptoms or have further questions.       Portions of the record may have been created with voice recognition software. Occasional wrong-word or sound-a-like substitutions may have occurred due to the inherent limitations of voice recognition software. Read the chart carefully and recognize, using context, where substitutions have occurred.    Pardeep Hewitt MD  Hematology / Oncology              This service was not originating from a related E/M service provided within the previous 7 days nor will  to an E/M service or procedure within the next 24 hours or my soonest available appointment.  Prevailing standard of care was able to be met in this audio-only visit.

## 2022-12-12 ENCOUNTER — OFFICE VISIT (OUTPATIENT)
Dept: HEMATOLOGY/ONCOLOGY | Facility: CLINIC | Age: 62
End: 2022-12-12
Payer: COMMERCIAL

## 2022-12-12 VITALS — DIASTOLIC BLOOD PRESSURE: 80 MMHG | BODY MASS INDEX: 21.47 KG/M2 | WEIGHT: 129 LBS | SYSTOLIC BLOOD PRESSURE: 120 MMHG

## 2022-12-12 DIAGNOSIS — C34.11 MALIGNANT NEOPLASM OF UPPER LOBE OF RIGHT LUNG: Primary | ICD-10-CM

## 2022-12-12 DIAGNOSIS — C34.91 NSCLC OF RIGHT LUNG: ICD-10-CM

## 2022-12-12 PROCEDURE — 3079F DIAST BP 80-89 MM HG: CPT | Mod: CPTII,95,, | Performed by: INTERNAL MEDICINE

## 2022-12-12 PROCEDURE — 1159F PR MEDICATION LIST DOCUMENTED IN MEDICAL RECORD: ICD-10-PCS | Mod: CPTII,95,, | Performed by: INTERNAL MEDICINE

## 2022-12-12 PROCEDURE — 3079F PR MOST RECENT DIASTOLIC BLOOD PRESSURE 80-89 MM HG: ICD-10-PCS | Mod: CPTII,95,, | Performed by: INTERNAL MEDICINE

## 2022-12-12 PROCEDURE — 99213 PR OFFICE/OUTPT VISIT, EST, LEVL III, 20-29 MIN: ICD-10-PCS | Mod: 95,,, | Performed by: INTERNAL MEDICINE

## 2022-12-12 PROCEDURE — 3074F SYST BP LT 130 MM HG: CPT | Mod: CPTII,95,, | Performed by: INTERNAL MEDICINE

## 2022-12-12 PROCEDURE — 3074F PR MOST RECENT SYSTOLIC BLOOD PRESSURE < 130 MM HG: ICD-10-PCS | Mod: CPTII,95,, | Performed by: INTERNAL MEDICINE

## 2022-12-12 PROCEDURE — 3008F PR BODY MASS INDEX (BMI) DOCUMENTED: ICD-10-PCS | Mod: CPTII,95,, | Performed by: INTERNAL MEDICINE

## 2022-12-12 PROCEDURE — 1159F MED LIST DOCD IN RCRD: CPT | Mod: CPTII,95,, | Performed by: INTERNAL MEDICINE

## 2022-12-12 PROCEDURE — 99213 OFFICE O/P EST LOW 20 MIN: CPT | Mod: 95,,, | Performed by: INTERNAL MEDICINE

## 2022-12-12 PROCEDURE — 3008F BODY MASS INDEX DOCD: CPT | Mod: CPTII,95,, | Performed by: INTERNAL MEDICINE

## 2022-12-15 ENCOUNTER — EXTERNAL HOME HEALTH (OUTPATIENT)
Dept: HOME HEALTH SERVICES | Facility: HOSPITAL | Age: 62
End: 2022-12-15
Payer: COMMERCIAL

## 2023-01-04 ENCOUNTER — LAB VISIT (OUTPATIENT)
Dept: LAB | Facility: HOSPITAL | Age: 63
End: 2023-01-04
Attending: INTERNAL MEDICINE
Payer: COMMERCIAL

## 2023-01-04 DIAGNOSIS — C34.11 MALIGNANT NEOPLASM OF UPPER LOBE OF RIGHT LUNG: ICD-10-CM

## 2023-01-04 LAB
ALBUMIN SERPL-MCNC: 3.8 G/DL (ref 3.4–4.8)
ALBUMIN/GLOB SERPL: 1.2 RATIO (ref 1.1–2)
ALP SERPL-CCNC: 59 UNIT/L (ref 40–150)
ALT SERPL-CCNC: 16 UNIT/L (ref 0–55)
AST SERPL-CCNC: 20 UNIT/L (ref 5–34)
BASOPHILS # BLD AUTO: 0.03 X10(3)/MCL (ref 0–0.2)
BASOPHILS NFR BLD AUTO: 0.5 %
BILIRUBIN DIRECT+TOT PNL SERPL-MCNC: 0.4 MG/DL
BUN SERPL-MCNC: 10.3 MG/DL (ref 9.8–20.1)
CALCIUM SERPL-MCNC: 9.1 MG/DL (ref 8.4–10.2)
CHLORIDE SERPL-SCNC: 104 MMOL/L (ref 98–107)
CO2 SERPL-SCNC: 31 MMOL/L (ref 23–31)
CREAT SERPL-MCNC: 0.72 MG/DL (ref 0.55–1.02)
EOSINOPHIL # BLD AUTO: 0.09 X10(3)/MCL (ref 0–0.9)
EOSINOPHIL NFR BLD AUTO: 1.6 %
ERYTHROCYTE [DISTWIDTH] IN BLOOD BY AUTOMATED COUNT: 12.2 % (ref 11–14.5)
GFR SERPLBLD CREATININE-BSD FMLA CKD-EPI: >60 MLS/MIN/1.73/M2
GLOBULIN SER-MCNC: 3.2 GM/DL (ref 2.4–3.5)
GLUCOSE SERPL-MCNC: 99 MG/DL (ref 82–115)
HCT VFR BLD AUTO: 41.6 % (ref 37–47)
HGB BLD-MCNC: 13.6 GM/DL (ref 12–16)
IMM GRANULOCYTES # BLD AUTO: 0.01 X10(3)/MCL (ref 0–0.04)
IMM GRANULOCYTES NFR BLD AUTO: 0.2 %
LYMPHOCYTES # BLD AUTO: 2.07 X10(3)/MCL (ref 0.6–4.6)
LYMPHOCYTES NFR BLD AUTO: 37.5 %
MCH RBC QN AUTO: 30.5 PG
MCHC RBC AUTO-ENTMCNC: 32.7 MG/DL (ref 33–36)
MCV RBC AUTO: 93.3 FL (ref 80–94)
MONOCYTES # BLD AUTO: 0.54 X10(3)/MCL (ref 0.1–1.3)
MONOCYTES NFR BLD AUTO: 9.8 %
NEUTROPHILS # BLD AUTO: 2.78 X10(3)/MCL (ref 2.1–9.2)
NEUTROPHILS NFR BLD AUTO: 50.4 %
NRBC BLD AUTO-RTO: 0 % (ref 0–1)
PLATELET # BLD AUTO: 238 X10(3)/MCL (ref 140–371)
PMV BLD AUTO: 8.8 FL (ref 9.4–12.4)
POTASSIUM SERPL-SCNC: 4.7 MMOL/L (ref 3.5–5.1)
PROT SERPL-MCNC: 7 GM/DL (ref 5.8–7.6)
RBC # BLD AUTO: 4.46 X10(6)/MCL (ref 4.2–5.4)
SODIUM SERPL-SCNC: 141 MMOL/L (ref 136–145)
WBC # SPEC AUTO: 5.5 X10(3)/MCL (ref 4.5–11.5)

## 2023-01-04 PROCEDURE — 85025 COMPLETE CBC W/AUTO DIFF WBC: CPT

## 2023-01-04 PROCEDURE — 80053 COMPREHEN METABOLIC PANEL: CPT

## 2023-01-04 PROCEDURE — 36415 COLL VENOUS BLD VENIPUNCTURE: CPT

## 2023-01-05 ENCOUNTER — OFFICE VISIT (OUTPATIENT)
Dept: HEMATOLOGY/ONCOLOGY | Facility: CLINIC | Age: 63
End: 2023-01-05
Payer: COMMERCIAL

## 2023-01-05 VITALS
HEART RATE: 86 BPM | BODY MASS INDEX: 20.95 KG/M2 | RESPIRATION RATE: 18 BRPM | SYSTOLIC BLOOD PRESSURE: 122 MMHG | WEIGHT: 125.88 LBS | TEMPERATURE: 99 F | DIASTOLIC BLOOD PRESSURE: 81 MMHG | OXYGEN SATURATION: 96 %

## 2023-01-05 DIAGNOSIS — C34.91 NSCLC OF RIGHT LUNG: Primary | ICD-10-CM

## 2023-01-05 DIAGNOSIS — I82.431 ACUTE DEEP VEIN THROMBOSIS (DVT) OF POPLITEAL VEIN OF RIGHT LOWER EXTREMITY: ICD-10-CM

## 2023-01-05 LAB
C1INH SERPL-MCNC: NORMAL MG/DL
DHEA SERPL-MCNC: NORMAL
ESTRIOL SERPL-MCNC: NORMAL NG/ML
ESTROGEN SERPL-MCNC: NORMAL PG/ML
INSULIN SERPL-ACNC: NORMAL U[IU]/ML
LAB AP CLINICAL INFORMATION: NORMAL
LAB AP GROSS DESCRIPTION: NORMAL
LAB AP INTRA OP: NORMAL
LAB AP REPORT FOOTNOTES: NORMAL
T3RU NFR SERPL: NORMAL %

## 2023-01-05 PROCEDURE — 3074F SYST BP LT 130 MM HG: CPT | Mod: CPTII,,, | Performed by: INTERNAL MEDICINE

## 2023-01-05 PROCEDURE — 1159F PR MEDICATION LIST DOCUMENTED IN MEDICAL RECORD: ICD-10-PCS | Mod: CPTII,,, | Performed by: INTERNAL MEDICINE

## 2023-01-05 PROCEDURE — 3074F PR MOST RECENT SYSTOLIC BLOOD PRESSURE < 130 MM HG: ICD-10-PCS | Mod: CPTII,,, | Performed by: INTERNAL MEDICINE

## 2023-01-05 PROCEDURE — 1159F MED LIST DOCD IN RCRD: CPT | Mod: CPTII,,, | Performed by: INTERNAL MEDICINE

## 2023-01-05 PROCEDURE — 3079F DIAST BP 80-89 MM HG: CPT | Mod: CPTII,,, | Performed by: INTERNAL MEDICINE

## 2023-01-05 PROCEDURE — 99214 PR OFFICE/OUTPT VISIT, EST, LEVL IV, 30-39 MIN: ICD-10-PCS | Mod: ,,, | Performed by: INTERNAL MEDICINE

## 2023-01-05 PROCEDURE — 3079F PR MOST RECENT DIASTOLIC BLOOD PRESSURE 80-89 MM HG: ICD-10-PCS | Mod: CPTII,,, | Performed by: INTERNAL MEDICINE

## 2023-01-05 PROCEDURE — 3008F BODY MASS INDEX DOCD: CPT | Mod: CPTII,,, | Performed by: INTERNAL MEDICINE

## 2023-01-05 PROCEDURE — 99214 OFFICE O/P EST MOD 30 MIN: CPT | Mod: ,,, | Performed by: INTERNAL MEDICINE

## 2023-01-05 PROCEDURE — 3008F PR BODY MASS INDEX (BMI) DOCUMENTED: ICD-10-PCS | Mod: CPTII,,, | Performed by: INTERNAL MEDICINE

## 2023-01-05 NOTE — PROGRESS NOTES
PROBLEM:  - Non-small cell bronchogenic carcinoma    HxPI:  62-year-old  female found to have on routine CT screen to have mass involving right upper lung field proven to be bronchogenic carcinoma, adenocarcinoma subtype.  Staging workup revealed no evidence of extrathoracic disease.  Patient underwent thoracotomy and right upper lobectomy on 2022.  And final path revealed a 3.5 x 2.5 x 1.2 cm adenocarcinoma that was poorly differentiated.  There was no evidence of lymph node involvement.  Margins were free of residual disease.  Final stage was 1B.  Patient referred to our service for recommendations regarding possible adjuvant treatment.    SOCIAL Hx:  .  Had 4 children, but 1  from motor vehicle accident.  Patient is a corporate .  CHILDHOOD Hx:  Negative for rheumatic fever.  Patient usual childhood illnesses.  FAMILY Hx:  Negative for malignancies or hematological disorders.  Family history positive for various relatives with AODM, heart disease, visual impairment.  ALLERGIES:  PCN  HABITS:  30+ pack year smoking history.  Patient was cutting down and is consuming less than 1/4 pack of cigarettes per day at the time of this evaluation postoperatively.  ADULT MEDICAL PROBLEMS:  No long-term major medical problems  SURGICAL Hx:  Status post right upper lobectomy, prior bronchoscopy,  section x 2  MEDICATIONS:  Claritin, Pepcid.    Review of Systems   Respiratory:  Negative for cough and shortness of breath.    Cardiovascular:  Negative for chest pain.   Gastrointestinal:  Negative for abdominal pain and diarrhea.   Genitourinary:  Negative for frequency.   Musculoskeletal:  Negative for back pain.   Skin:  Negative for rash.   Neurological:  Negative for headaches.   Psychiatric/Behavioral:  The patient is not nervous/anxious.          LABS / IMAGING:       - 9/15/2022 PET: Right upper lung lobe mass opacity with extensive spiculations and avid  hypermetabolism is of high concern for primary lung carcinoma. . No metastatic findings.       PATHOLOGY:      - 11/2022: Lobectomy Right:  3.5 cm Invasive solid adenocarcinoma, G3, poorly differentiated. No visceral Pleura Invasion or Lymphovascular Invasion & all margins negative for invasive carcinoma. Lymph Node 0/8   STAGE pT2a pN0   - NGS: , PD-L1 2%; MSI-stable, EGFR Exon 18 p.G719A amino acid mutation; BRAF Exon 11 p.G469V amino acid mutation. Variants of uncertain significance mutations in MET, STK11    Assessment:     NSCLC / poorly differentiated adenocarcinoma; Stage IB pT2a pN0 (0/8)   - NGS: , PD-L1 2%; MSI-stable, EGFR Exon 18 p.G719A amino acid mutation; BRAF Exon 11 p.G469V amino acid mutation. Variants of uncertain significance mutations in MET, STK11  - s/p RUL lobectomy 11/2022      Plan:     - discussed NGS finding with the patient, noted to have EGFR mutation but it was noted to be Exon 18 while osimertininb is approved for exon 19 deletion or Exon 21 L858R mutation.   - discuss option of adjuvant chemotherapy as it is recommended in high risk cancers, while pt does have high-risk feature of poorly differentiated adenocarcinoma. Patient would like to think about it and then will let us know on follow-up visit  - message sent to Shirley Amanda for evaluation to enroll in clinical trial, if available  - MD VISIT - 2 WEEKS - VIRTUAL VISIT TO FOLLOW UP ON DECISION ON TREATMENT     The patient was seen, interviewed and examined. Pertinent lab and radiology studies were reviewed. Pt instructed to call should develop concerning signs/symptoms or have further questions.       Portions of the record may have been created with voice recognition software. Occasional wrong-word or sound-a-like substitutions may have occurred due to the inherent limitations of voice recognition software. Read the chart carefully and recognize, using context, where substitutions have occurred.    Pardeep Hewitt,  MD  Hematology / Oncology         Answers submitted by the patient for this visit:  Review of Systems Questionnaire (Submitted on 1/2/2023)  appetite change : No  unexpected weight change: Yes  mouth sores: No  visual disturbance: No  adenopathy: No

## 2023-01-11 ENCOUNTER — TUMOR BOARD CONFERENCE (OUTPATIENT)
Dept: HEMATOLOGY/ONCOLOGY | Facility: CLINIC | Age: 63
End: 2023-01-11
Payer: COMMERCIAL

## 2023-01-11 NOTE — Clinical Note
"Dr. Hewitt,  Thank you for sending this patient for review at the [facility] Tumor Board. We do not have any treatment trials for this patient but have a trial called STRATA [place] that can be used after definitive treatment to help monitor for cancer recurrence via blood test. Here is a summary about this study:  "This is a trial which measures Minimal Residual Disease (MRD) through circulating tumor DNA for any patient who has had cancer (Stage I - III) in the last 5 years and is now cancer free (any patient who has had definitive surgery, radiation, etc. would qualify as long as we have tissue in pathology or can obtain tissue).   [facility] will procure the tissue, sequence it, and create a tumor informed blood-based test for these patients to monitor for recurrence.  Patients will then have blood drawn every three months to assess recurrence-all at no cost to the patient."    Thank You, Shirley Amanda NP   "

## 2023-01-12 NOTE — PROGRESS NOTES
[facility] Tumor Board    Date: 1/12/2023    Patient Name: Cynthia Hanson    MRN: [id number]    Diagnosis: Stage 1B lung adenocarcinoma  - Diagnosed in 11/2022.    Referring Provider: Dr. Hewitt    Present PCTP Providers:     Dr. Dre Brown, Dr. Miguel Brar, Dr. Brett Yarbrough, Michelle Ford, NP, Shirley Amanda NP    Patient Summary:  Pathology: Adenocarcinoma  Has failed       Current treatment(s):    ECOG: Fully active, able to carry on all pre-disease performance without restriction    Molecular Workup:    Other  Other Molecular Workup: NGS: PD-L1 2%; MSI-stable, EGFR Exon 18 p.G719A amino acid mutation; BRAF Exon 11 p.G469V amino acid mutation. Variants of uncertain significance mutations in MET, STK11:      Board Recommendations:    Standard of care recommendations: STRATA [place]   Trial recommendations: Late phase: No trials.  Early phase: no treatment trials for Stage I.

## 2023-01-16 NOTE — PROGRESS NOTES
Established Patient - Audio Only Telehealth Visit     The patient location is: Home   The chief complaint leading to consultation is: follow up   Visit type: Virtual visit with audio only (telephone)  Total time spent with patient: 15       The reason for the audio only service rather than synchronous audio and video virtual visit was related to technical difficulties or patient preference/necessity.     Each patient to whom I provide medical services by telemedicine is:  (1) informed of the relationship between the physician and patient and the respective role of any other health care provider with respect to management of the patient; and (2) notified that they may decline to receive medical services by telemedicine and may withdraw from such care at any time. Patient verbally consented to receive this service via voice-only telephone call.            PROBLEM:  - Non-small cell bronchogenic carcinoma    HxPI:  62-year-old  female found to have on routine CT screen to have mass involving right upper lung field proven to be bronchogenic carcinoma, adenocarcinoma subtype.  Staging workup revealed no evidence of extrathoracic disease.  Patient underwent thoracotomy and right upper lobectomy on 2022.  And final path revealed a 3.5 x 2.5 x 1.2 cm adenocarcinoma that was poorly differentiated.  There was no evidence of lymph node involvement.  Margins were free of residual disease.  Final stage was 1B.  Patient referred to our service for recommendations regarding possible adjuvant treatment.    SOCIAL Hx:  .  Had 4 children, but 1  from motor vehicle accident.  Patient is a corporate .  CHILDHOOD Hx:  Negative for rheumatic fever.  Patient usual childhood illnesses.  FAMILY Hx:  Negative for malignancies or hematological disorders.  Family history positive for various relatives with AODM, heart disease, visual impairment.  ALLERGIES:  PCN  HABITS:  30+ pack year smoking  history.  Patient was cutting down and is consuming less than 1/4 pack of cigarettes per day at the time of this evaluation postoperatively.  ADULT MEDICAL PROBLEMS:  No long-term major medical problems  SURGICAL Hx:  Status post right upper lobectomy, prior bronchoscopy,  section x 2  MEDICATIONS:  Claritin, Pepcid.    Review of Systems   Respiratory:  Negative for cough and shortness of breath.    Cardiovascular:  Negative for chest pain.   Gastrointestinal:  Negative for abdominal pain and diarrhea.   Genitourinary:  Negative for frequency.   Musculoskeletal:  Negative for back pain.   Skin:  Negative for rash.   Neurological:  Negative for headaches.   Psychiatric/Behavioral:  The patient is not nervous/anxious.          LABS / IMAGING:       - 9/15/2022 PET: Right upper lung lobe mass opacity with extensive spiculations and avid hypermetabolism (measures 2.0 x 4.2 cm) is of high concern for primary lung carcinoma.  This extends further in the apical portion with thick reticulations reaching the medial and lateral pleural surface on image 61 series 3 and also inferiorly on image 68 series 3. No metastatic findings.       PATHOLOGY:      - 2022: Lobectomy Right:  3.5 cm Invasive solid adenocarcinoma, G3, poorly differentiated. No visceral Pleura Invasion or Lymphovascular Invasion & all margins negative for invasive carcinoma. Lymph Node 0/8   STAGE pT2a pN0   - NGS: , PD-L1 2%; MSI-stable, EGFR Exon 18 p.G719A amino acid mutation; BRAF Exon 11 p.G469V amino acid mutation. Variants of uncertain significance mutations in MET, STK11    Assessment:     NSCLC / poorly differentiated adenocarcinoma; Stage IB pT2a pN0 (0/8)   - NGS: , PD-L1 2%; MSI-stable, EGFR Exon 18 p.G719A amino acid mutation; BRAF Exon 11 p.G469V amino acid mutation. Variants of uncertain significance mutations in MET, STK11  - s/p RUL lobectomy 2022      Plan:     - discussed NGS finding with the patient, noted to have EGFR mutation  but it was noted to be Exon 18 while osimertininb is approved for exon 19 deletion or Exon 21 L858R mutation.   - discuss option of adjuvant chemotherapy as it is recommended in high risk cancers, while pt does have high-risk feature of poorly differentiated adenocarcinoma.  Patient would like to be treated with adjuvant chemotherapy.  Plan placed and start the patient after approval  - Will refer the pt for clinical trial STRATA [place] on follow up visit   - MD / LAB/ TREATMENT VISIT - 2 WEEKS       The patient was seen, interviewed and examined. Pertinent lab and radiology studies were reviewed. Pt instructed to call should develop concerning signs/symptoms or have further questions.       Portions of the record may have been created with voice recognition software. Occasional wrong-word or sound-a-like substitutions may have occurred due to the inherent limitations of voice recognition software. Read the chart carefully and recognize, using context, where substitutions have occurred.    Pardeep Hewitt MD  Hematology / Oncology              This service was not originating from a related E/M service provided within the previous 7 days nor will  to an E/M service or procedure within the next 24 hours or my soonest available appointment.  Prevailing standard of care was able to be met in this audio-only visit.        Answers submitted by the patient for this visit:  Review of Systems Questionnaire (Submitted on 1/14/2023)  appetite change : No  unexpected weight change: No  mouth sores: No  visual disturbance: No  adenopathy: No

## 2023-01-17 ENCOUNTER — OFFICE VISIT (OUTPATIENT)
Dept: HEMATOLOGY/ONCOLOGY | Facility: CLINIC | Age: 63
End: 2023-01-17
Payer: COMMERCIAL

## 2023-01-17 DIAGNOSIS — C34.91 NSCLC OF RIGHT LUNG: Primary | ICD-10-CM

## 2023-01-17 PROCEDURE — 99214 OFFICE O/P EST MOD 30 MIN: CPT | Mod: 95,,, | Performed by: INTERNAL MEDICINE

## 2023-01-17 PROCEDURE — 99214 PR OFFICE/OUTPT VISIT, EST, LEVL IV, 30-39 MIN: ICD-10-PCS | Mod: 95,,, | Performed by: INTERNAL MEDICINE

## 2023-01-27 ENCOUNTER — OFFICE VISIT (OUTPATIENT)
Dept: HEMATOLOGY/ONCOLOGY | Facility: CLINIC | Age: 63
End: 2023-01-27
Payer: COMMERCIAL

## 2023-01-27 VITALS
BODY MASS INDEX: 21.46 KG/M2 | DIASTOLIC BLOOD PRESSURE: 76 MMHG | TEMPERATURE: 98 F | SYSTOLIC BLOOD PRESSURE: 124 MMHG | RESPIRATION RATE: 18 BRPM | WEIGHT: 128.81 LBS | OXYGEN SATURATION: 98 % | HEART RATE: 91 BPM | HEIGHT: 65 IN

## 2023-01-27 DIAGNOSIS — C34.11 MALIGNANT NEOPLASM OF UPPER LOBE OF RIGHT LUNG: Primary | ICD-10-CM

## 2023-01-27 PROCEDURE — 1159F MED LIST DOCD IN RCRD: CPT | Mod: CPTII,,,

## 2023-01-27 PROCEDURE — 3008F PR BODY MASS INDEX (BMI) DOCUMENTED: ICD-10-PCS | Mod: CPTII,,,

## 2023-01-27 PROCEDURE — 3008F BODY MASS INDEX DOCD: CPT | Mod: CPTII,,,

## 2023-01-27 PROCEDURE — 1159F PR MEDICATION LIST DOCUMENTED IN MEDICAL RECORD: ICD-10-PCS | Mod: CPTII,,,

## 2023-01-27 PROCEDURE — 3074F SYST BP LT 130 MM HG: CPT | Mod: CPTII,,,

## 2023-01-27 PROCEDURE — 99215 OFFICE O/P EST HI 40 MIN: CPT | Mod: ,,,

## 2023-01-27 PROCEDURE — 3074F PR MOST RECENT SYSTOLIC BLOOD PRESSURE < 130 MM HG: ICD-10-PCS | Mod: CPTII,,,

## 2023-01-27 PROCEDURE — 3078F DIAST BP <80 MM HG: CPT | Mod: CPTII,,,

## 2023-01-27 PROCEDURE — 99215 PR OFFICE/OUTPT VISIT, EST, LEVL V, 40-54 MIN: ICD-10-PCS | Mod: ,,,

## 2023-01-27 PROCEDURE — 3078F PR MOST RECENT DIASTOLIC BLOOD PRESSURE < 80 MM HG: ICD-10-PCS | Mod: CPTII,,,

## 2023-01-27 RX ORDER — FOLIC ACID 1 MG/1
1 TABLET ORAL DAILY
Qty: 30 TABLET | Refills: 6 | Status: SHIPPED | OUTPATIENT
Start: 2023-01-27 | End: 2023-08-24

## 2023-01-27 RX ORDER — ONDANSETRON HYDROCHLORIDE 8 MG/1
8 TABLET, FILM COATED ORAL EVERY 8 HOURS PRN
Qty: 30 TABLET | Refills: 1 | Status: SHIPPED | OUTPATIENT
Start: 2023-01-27 | End: 2023-09-11

## 2023-01-27 RX ORDER — PROMETHAZINE HYDROCHLORIDE 12.5 MG/1
12.5 TABLET ORAL EVERY 4 HOURS PRN
Qty: 60 TABLET | Refills: 1 | Status: SHIPPED | OUTPATIENT
Start: 2023-01-27 | End: 2023-09-11

## 2023-01-27 NOTE — PROGRESS NOTES
PROBLEM:  - Non-small cell bronchogenic carcinoma    HxPI:  62-year-old  female found to have on routine CT screen to have mass involving right upper lung field proven to be bronchogenic carcinoma, adenocarcinoma subtype.  Staging workup revealed no evidence of extrathoracic disease.  Patient underwent thoracotomy and right upper lobectomy on 2022.  And final path revealed a 3.5 x 2.5 x 1.2 cm adenocarcinoma that was poorly differentiated.  There was no evidence of lymph node involvement.  Margins were free of residual disease.  Final stage was 1B.  Patient referred to our service for recommendations regarding possible adjuvant treatment.    Interval History  Patient returns for chemo education today. She is doing well and w/o complaints or concerns    SOCIAL Hx:  .  Had 4 children, but 1  from motor vehicle accident.  Patient is a corporate .  CHILDHOOD Hx:  Negative for rheumatic fever.  Patient usual childhood illnesses.  FAMILY Hx:  Negative for malignancies or hematological disorders.  Family history positive for various relatives with AODM, heart disease, visual impairment.  ALLERGIES:  PCN  HABITS:  30+ pack year smoking history.  Patient was cutting down and is consuming less than 1/4 pack of cigarettes per day at the time of this evaluation postoperatively.  ADULT MEDICAL PROBLEMS:  No long-term major medical problems  SURGICAL Hx:  Status post right upper lobectomy, prior bronchoscopy,  section x 2  MEDICATIONS:  Claritin, Pepcid.    Review of Systems   Respiratory:  Negative for cough and shortness of breath.    Cardiovascular:  Negative for chest pain.   Gastrointestinal:  Negative for abdominal pain and diarrhea.   Genitourinary:  Negative for frequency.   Musculoskeletal:  Negative for back pain.   Skin:  Negative for rash.   Neurological:  Negative for headaches.   Psychiatric/Behavioral:  The patient is not nervous/anxious.          LABS /  IMAGING:       - 9/15/2022 PET: Right upper lung lobe mass opacity with extensive spiculations and avid hypermetabolism (measures 2.0 x 4.2 cm) is of high concern for primary lung carcinoma.  This extends further in the apical portion with thick reticulations reaching the medial and lateral pleural surface on image 61 series 3 and also inferiorly on image 68 series 3. No metastatic findings.       PATHOLOGY:      - 11/2022: Lobectomy Right:  3.5 cm Invasive solid adenocarcinoma, G3, poorly differentiated. No visceral Pleura Invasion or Lymphovascular Invasion & all margins negative for invasive carcinoma. Lymph Node 0/8   STAGE pT2a pN0   - NGS: , PD-L1 2%; MSI-stable, EGFR Exon 18 p.G719A amino acid mutation; BRAF Exon 11 p.G469V amino acid mutation. Variants of uncertain significance mutations in MET, STK11    Assessment:     NSCLC / poorly differentiated adenocarcinoma; Stage IB pT2a pN0 (0/8)   - NGS: , PD-L1 2%; MSI-stable, EGFR Exon 18 p.G719A amino acid mutation; BRAF Exon 11 p.G469V amino acid mutation. Variants of uncertain significance mutations in MET, STK11  - s/p RUL lobectomy 11/2022      Plan:     - discussed NGS finding with the patient, noted to have EGFR mutation but it was noted to be Exon 18 while osimertininb is approved for exon 19 deletion or Exon 21 L858R mutation.   - Will refer the pt for clinical trial STRATA [place] on follow up visit   - F/u 2/1/23 fo OV and cycle 1 Pemetrexed and carboplatin  - cbc cmp today after visit  - zofran, phenergan, and folic acid sent to pharmacy.      Chemotherapy teaching provided to patient. Plan of care including chemotherapy regimen, schedule expectations, potential side effects, as well as prevention/management of side effects were discussed in detail. Office contact information provided along with instructions on symptoms that warrant a call to the office, for example a temperature of > 100.5, uncontrolled side effects, severe fatigue etc. Time was  given for patient to ask questions. All questions answered and they verbalized understanding. They were instructed to call with any concerns or additional questions.      NERIS Pina

## 2023-01-31 NOTE — PROGRESS NOTES
PROBLEM:  - Non-small cell bronchogenic carcinoma    HxPI:  62-year-old  female found to have on routine CT screen to have mass involving right upper lung field proven to be bronchogenic carcinoma, adenocarcinoma subtype.  Staging workup revealed no evidence of extrathoracic disease.  Patient underwent thoracotomy and right upper lobectomy on 2022.  And final path revealed a 3.5 x 2.5 x 1.2 cm adenocarcinoma that was poorly differentiated.  There was no evidence of lymph node involvement.  Margins were free of residual disease.  Final stage was 1B.  Patient referred to our service for recommendations regarding possible adjuvant treatment.      INTERVAL HISTORY:   Patient is here to be started on chemotherapy.  Denies any chest pain, shortness her breath, nausea, vomiting, diarrhea.  Denies any numbness or tingling in the extremities.      SOCIAL Hx:  .  Had 4 children, but 1  from motor vehicle accident.  Patient is a corporate .  CHILDHOOD Hx:  Negative for rheumatic fever.  Patient usual childhood illnesses.  FAMILY Hx:  Negative for malignancies or hematological disorders.  Family history positive for various relatives with AODM, heart disease, visual impairment.  ALLERGIES:  PCN  HABITS:  30+ pack year smoking history.  Patient was cutting down and is consuming less than 1/4 pack of cigarettes per day at the time of this evaluation postoperatively.  ADULT MEDICAL PROBLEMS:  No long-term major medical problems  SURGICAL Hx:  Status post right upper lobectomy, prior bronchoscopy,  section x 2  MEDICATIONS:  Claritin, Pepcid.    Review of Systems   Respiratory:  Negative for cough and shortness of breath.    Cardiovascular:  Negative for chest pain.   Gastrointestinal:  Negative for abdominal pain and diarrhea.   Genitourinary:  Negative for frequency.   Musculoskeletal:  Negative for back pain.   Skin:  Negative for rash.   Neurological:  Negative for  headaches.   Psychiatric/Behavioral:  The patient is not nervous/anxious.       Vitals:    02/01/23 0833   BP: 120/81   Pulse: 83   Resp: 18   Temp: 98.4 °F (36.9 °C)        Physical Exam        LABS / IMAGING:     - 01/27/2023:  HCV nonreactive.  Hep B surface antigen nonreactive, hep B core antibody negative, HIV nonreactive, WBC 7.45, hemoglobin 13.5, platelets 270, creatinine 0.63, AST 17, ALT 11, alkaline phosphatase 63, bilirubin 0.4      - 9/15/2022 PET: Right upper lung lobe mass opacity with extensive spiculations and avid hypermetabolism (measures 2.0 x 4.2 cm) is of high concern for primary lung carcinoma.  This extends further in the apical portion with thick reticulations reaching the medial and lateral pleural surface on image 61 series 3 and also inferiorly on image 68 series 3. No metastatic findings.       PATHOLOGY:      - 11/2022: Lobectomy Right: 3.5 cm Invasive solid adenocarcinoma, G3, poorly differentiated. No visceral Pleura Invasion or Lymphovascular Invasion & all margins negative for invasive carcinoma. Lymph Node 0/8   STAGE pT2a pN0   - NGS: , PD-L1 2%; MSI-stable, EGFR Exon 18 p.G719A amino acid mutation; BRAF Exon 11 p.G469V amino acid mutation. Variants of uncertain significance mutations in MET, STK11    Assessment:     NSCLC / poorly differentiated adenocarcinoma; Stage IB pT2a pN0 (0/8)   - NGS: , PD-L1 2%; MSI-stable, EGFR Exon 18 p.G719A amino acid mutation; BRAF Exon 11 p.G469V amino acid mutation. Variants of uncertain significance mutations in MET, STK11  - s/p RUL lobectomy 11/2022      Plan:     - discussed NGS finding with the patient, noted to have EGFR mutation but it was noted to be Exon 18 while osimertininb is approved for exon 19 deletion or Exon 21 L858R mutation.   - discuss option of adjuvant chemotherapy as it is recommended in high risk cancers, while pt does have high-risk feature of poorly differentiated adenocarcinoma.  Patient would like to be treated with  adjuvant chemotherapy. Will start with treatment today and also treat with b12   - Refer for clinical trial STRATA [place] - discussed and not recruiting now  - MD / LAB VISIT - 1 WEEKS for tox check       The patient was seen, interviewed and examined. Pertinent lab and radiology studies were reviewed. Pt instructed to call should develop concerning signs/symptoms or have further questions.       Portions of the record may have been created with voice recognition software. Occasional wrong-word or sound-a-like substitutions may have occurred due to the inherent limitations of voice recognition software. Read the chart carefully and recognize, using context, where substitutions have occurred.    Pardeep Hewitt MD  Hematology / Oncology         Answers submitted by the patient for this visit:  Review of Systems Questionnaire (Submitted on 1/30/2023)  appetite change : No  unexpected weight change: No  mouth sores: No  visual disturbance: No  adenopathy: No

## 2023-02-01 ENCOUNTER — OFFICE VISIT (OUTPATIENT)
Dept: HEMATOLOGY/ONCOLOGY | Facility: CLINIC | Age: 63
End: 2023-02-01
Payer: COMMERCIAL

## 2023-02-01 VITALS
SYSTOLIC BLOOD PRESSURE: 120 MMHG | OXYGEN SATURATION: 98 % | BODY MASS INDEX: 21.47 KG/M2 | TEMPERATURE: 98 F | WEIGHT: 129 LBS | DIASTOLIC BLOOD PRESSURE: 81 MMHG | RESPIRATION RATE: 18 BRPM | HEART RATE: 83 BPM

## 2023-02-01 DIAGNOSIS — Z51.11 ENCOUNTER FOR ANTINEOPLASTIC CHEMOTHERAPY: Primary | ICD-10-CM

## 2023-02-01 DIAGNOSIS — C34.91 NSCLC OF RIGHT LUNG: ICD-10-CM

## 2023-02-01 PROCEDURE — 3074F PR MOST RECENT SYSTOLIC BLOOD PRESSURE < 130 MM HG: ICD-10-PCS | Mod: CPTII,,, | Performed by: INTERNAL MEDICINE

## 2023-02-01 PROCEDURE — 3079F PR MOST RECENT DIASTOLIC BLOOD PRESSURE 80-89 MM HG: ICD-10-PCS | Mod: CPTII,,, | Performed by: INTERNAL MEDICINE

## 2023-02-01 PROCEDURE — 3008F BODY MASS INDEX DOCD: CPT | Mod: CPTII,,, | Performed by: INTERNAL MEDICINE

## 2023-02-01 PROCEDURE — 99215 OFFICE O/P EST HI 40 MIN: CPT | Mod: ,,, | Performed by: INTERNAL MEDICINE

## 2023-02-01 PROCEDURE — 3079F DIAST BP 80-89 MM HG: CPT | Mod: CPTII,,, | Performed by: INTERNAL MEDICINE

## 2023-02-01 PROCEDURE — 99215 PR OFFICE/OUTPT VISIT, EST, LEVL V, 40-54 MIN: ICD-10-PCS | Mod: ,,, | Performed by: INTERNAL MEDICINE

## 2023-02-01 PROCEDURE — 3008F PR BODY MASS INDEX (BMI) DOCUMENTED: ICD-10-PCS | Mod: CPTII,,, | Performed by: INTERNAL MEDICINE

## 2023-02-01 PROCEDURE — 1159F PR MEDICATION LIST DOCUMENTED IN MEDICAL RECORD: ICD-10-PCS | Mod: CPTII,,, | Performed by: INTERNAL MEDICINE

## 2023-02-01 PROCEDURE — 1159F MED LIST DOCD IN RCRD: CPT | Mod: CPTII,,, | Performed by: INTERNAL MEDICINE

## 2023-02-01 PROCEDURE — 3074F SYST BP LT 130 MM HG: CPT | Mod: CPTII,,, | Performed by: INTERNAL MEDICINE

## 2023-02-01 RX ORDER — DEXAMETHASONE 4 MG/1
8 TABLET ORAL SEE ADMIN INSTRUCTIONS
Qty: 24 TABLET | Refills: 5 | Status: SHIPPED | OUTPATIENT
Start: 2023-02-01 | End: 2023-03-15 | Stop reason: SDUPTHER

## 2023-02-01 RX ORDER — DEXAMETHASONE 4 MG/1
4 TABLET ORAL EVERY 6 HOURS
Qty: 120 TABLET | Refills: 0 | Status: CANCELLED | OUTPATIENT
Start: 2023-02-01 | End: 2024-02-01

## 2023-02-01 RX ORDER — HEPARIN 100 UNIT/ML
500 SYRINGE INTRAVENOUS
Status: CANCELLED | OUTPATIENT
Start: 2023-02-01

## 2023-02-01 RX ORDER — SODIUM CHLORIDE 0.9 % (FLUSH) 0.9 %
10 SYRINGE (ML) INJECTION
Status: CANCELLED | OUTPATIENT
Start: 2023-02-17

## 2023-02-01 RX ORDER — SODIUM CHLORIDE 0.9 % (FLUSH) 0.9 %
10 SYRINGE (ML) INJECTION
Status: CANCELLED | OUTPATIENT
Start: 2023-02-01

## 2023-02-01 RX ORDER — CYANOCOBALAMIN 1000 UG/ML
1000 INJECTION, SOLUTION INTRAMUSCULAR; SUBCUTANEOUS
Status: CANCELLED | OUTPATIENT
Start: 2023-02-17

## 2023-02-01 RX ORDER — CYANOCOBALAMIN 1000 UG/ML
1000 INJECTION, SOLUTION INTRAMUSCULAR; SUBCUTANEOUS
Status: CANCELLED | OUTPATIENT
Start: 2023-02-01

## 2023-02-01 RX ORDER — HEPARIN 100 UNIT/ML
500 SYRINGE INTRAVENOUS
Status: CANCELLED | OUTPATIENT
Start: 2023-02-17

## 2023-02-08 ENCOUNTER — OFFICE VISIT (OUTPATIENT)
Dept: HEMATOLOGY/ONCOLOGY | Facility: CLINIC | Age: 63
End: 2023-02-08
Payer: COMMERCIAL

## 2023-02-08 VITALS
TEMPERATURE: 98 F | HEART RATE: 94 BPM | DIASTOLIC BLOOD PRESSURE: 79 MMHG | RESPIRATION RATE: 18 BRPM | OXYGEN SATURATION: 98 % | WEIGHT: 126.69 LBS | SYSTOLIC BLOOD PRESSURE: 120 MMHG | BODY MASS INDEX: 21.11 KG/M2 | HEIGHT: 65 IN

## 2023-02-08 DIAGNOSIS — C34.91 NSCLC OF RIGHT LUNG: Primary | ICD-10-CM

## 2023-02-08 PROCEDURE — 3074F SYST BP LT 130 MM HG: CPT | Mod: CPTII,,,

## 2023-02-08 PROCEDURE — 3078F PR MOST RECENT DIASTOLIC BLOOD PRESSURE < 80 MM HG: ICD-10-PCS | Mod: CPTII,,,

## 2023-02-08 PROCEDURE — 3078F DIAST BP <80 MM HG: CPT | Mod: CPTII,,,

## 2023-02-08 PROCEDURE — 99214 PR OFFICE/OUTPT VISIT, EST, LEVL IV, 30-39 MIN: ICD-10-PCS | Mod: ,,,

## 2023-02-08 PROCEDURE — 3008F BODY MASS INDEX DOCD: CPT | Mod: CPTII,,,

## 2023-02-08 PROCEDURE — 1159F PR MEDICATION LIST DOCUMENTED IN MEDICAL RECORD: ICD-10-PCS | Mod: CPTII,,,

## 2023-02-08 PROCEDURE — 3008F PR BODY MASS INDEX (BMI) DOCUMENTED: ICD-10-PCS | Mod: CPTII,,,

## 2023-02-08 PROCEDURE — 1159F MED LIST DOCD IN RCRD: CPT | Mod: CPTII,,,

## 2023-02-08 PROCEDURE — 3074F PR MOST RECENT SYSTOLIC BLOOD PRESSURE < 130 MM HG: ICD-10-PCS | Mod: CPTII,,,

## 2023-02-08 PROCEDURE — 99214 OFFICE O/P EST MOD 30 MIN: CPT | Mod: ,,,

## 2023-02-08 RX ORDER — ACETAMINOPHEN 325 MG/1
325 TABLET ORAL EVERY 6 HOURS PRN
COMMUNITY

## 2023-02-22 ENCOUNTER — OFFICE VISIT (OUTPATIENT)
Dept: HEMATOLOGY/ONCOLOGY | Facility: CLINIC | Age: 63
End: 2023-02-22
Payer: COMMERCIAL

## 2023-02-22 VITALS
HEART RATE: 89 BPM | DIASTOLIC BLOOD PRESSURE: 74 MMHG | WEIGHT: 129.63 LBS | BODY MASS INDEX: 21.57 KG/M2 | RESPIRATION RATE: 18 BRPM | SYSTOLIC BLOOD PRESSURE: 117 MMHG | TEMPERATURE: 99 F | OXYGEN SATURATION: 97 %

## 2023-02-22 DIAGNOSIS — C34.91 NSCLC OF RIGHT LUNG: Primary | ICD-10-CM

## 2023-02-22 PROCEDURE — 3078F PR MOST RECENT DIASTOLIC BLOOD PRESSURE < 80 MM HG: ICD-10-PCS | Mod: CPTII,,,

## 2023-02-22 PROCEDURE — 3074F SYST BP LT 130 MM HG: CPT | Mod: CPTII,,,

## 2023-02-22 PROCEDURE — 1159F MED LIST DOCD IN RCRD: CPT | Mod: CPTII,,,

## 2023-02-22 PROCEDURE — 3008F BODY MASS INDEX DOCD: CPT | Mod: CPTII,,,

## 2023-02-22 PROCEDURE — 3074F PR MOST RECENT SYSTOLIC BLOOD PRESSURE < 130 MM HG: ICD-10-PCS | Mod: CPTII,,,

## 2023-02-22 PROCEDURE — 3078F DIAST BP <80 MM HG: CPT | Mod: CPTII,,,

## 2023-02-22 PROCEDURE — 1159F PR MEDICATION LIST DOCUMENTED IN MEDICAL RECORD: ICD-10-PCS | Mod: CPTII,,,

## 2023-02-22 PROCEDURE — 99215 OFFICE O/P EST HI 40 MIN: CPT | Mod: ,,,

## 2023-02-22 PROCEDURE — 3008F PR BODY MASS INDEX (BMI) DOCUMENTED: ICD-10-PCS | Mod: CPTII,,,

## 2023-02-22 PROCEDURE — 99215 PR OFFICE/OUTPT VISIT, EST, LEVL V, 40-54 MIN: ICD-10-PCS | Mod: ,,,

## 2023-02-22 RX ORDER — SODIUM CHLORIDE 0.9 % (FLUSH) 0.9 %
10 SYRINGE (ML) INJECTION
Status: CANCELLED | OUTPATIENT
Start: 2023-02-22

## 2023-02-22 RX ORDER — HEPARIN 100 UNIT/ML
500 SYRINGE INTRAVENOUS
Status: CANCELLED | OUTPATIENT
Start: 2023-02-22

## 2023-02-22 NOTE — PROGRESS NOTES
PROBLEM:  - Non-small cell bronchogenic carcinoma    HxPI:  62-year-old  female found to have on routine CT screen to have mass involving right upper lung field proven to be bronchogenic carcinoma, adenocarcinoma subtype.  Staging workup revealed no evidence of extrathoracic disease.  Patient underwent thoracotomy and right upper lobectomy on 2022.  And final path revealed a 3.5 x 2.5 x 1.2 cm adenocarcinoma that was poorly differentiated.  There was no evidence of lymph node involvement.  Margins were free of residual disease.  Final stage was 1B.  Patient referred to our service for recommendations regarding possible adjuvant treatment.      INTERVAL HISTORY:   Patient returns for follow-up NSCLC. She has tolerated cycle 1 alimta/carbo w/o significant SEs. She feels well and appetite is good. Denies any chest pain, shortness her breath, n/v/d/c, abnormal bleeding issues, fever, dysuria, mouth sores, or rash.   Denies any numbness or tingling in the extremities.      SOCIAL Hx:  .  Had 4 children, but 1  from motor vehicle accident.  Patient is a corporate .  CHILDHOOD Hx:  Negative for rheumatic fever.  Patient usual childhood illnesses.  FAMILY Hx:  Negative for malignancies or hematological disorders.  Family history positive for various relatives with AODM, heart disease, visual impairment.  ALLERGIES:  PCN  HABITS:  30+ pack year smoking history.  Patient was cutting down and is consuming less than 1/4 pack of cigarettes per day at the time of this evaluation postoperatively.  ADULT MEDICAL PROBLEMS:  No long-term major medical problems  SURGICAL Hx:  Status post right upper lobectomy, prior bronchoscopy,  section x 2  MEDICATIONS:  Claritin, Pepcid.    Review of Systems   Respiratory:  Negative for cough and shortness of breath.    Cardiovascular:  Negative for chest pain.   Gastrointestinal:  Negative for abdominal pain and diarrhea.    Genitourinary:  Negative for frequency.   Musculoskeletal:  Negative for back pain.   Skin:  Negative for rash.   Neurological:  Negative for headaches.   Psychiatric/Behavioral:  The patient is not nervous/anxious.       Vitals:    23 0928   BP: 117/74   Pulse: 89   Resp: 18   Temp: 98.8 °F (37.1 °C)          Physical Exam        LABS / IMAGIN/21/23 CMP and CBC unremarkable, mag 1.9, phosphorus 4.7  23: CBC and CMP WNL, mag 2.0, phosphorus 3.8  - 2023:  HCV nonreactive.  Hep B surface antigen nonreactive, hep B core antibody negative, HIV nonreactive, WBC 7.45, hemoglobin 13.5, platelets 270, creatinine 0.63, AST 17, ALT 11, alkaline phosphatase 63, bilirubin 0.4      - 9/15/2022 PET: Right upper lung lobe mass opacity with extensive spiculations and avid hypermetabolism (measures 2.0 x 4.2 cm) is of high concern for primary lung carcinoma.  This extends further in the apical portion with thick reticulations reaching the medial and lateral pleural surface on image 61 series 3 and also inferiorly on image 68 series 3. No metastatic findings.       PATHOLOGY:      - 2022: Lobectomy Right: 3.5 cm Invasive solid adenocarcinoma, G3, poorly differentiated. No visceral Pleura Invasion or Lymphovascular Invasion & all margins negative for invasive carcinoma. Lymph Node 0/8   STAGE pT2a pN0   - NGS: , PD-L1 2%; MSI-stable, EGFR Exon 18 p.G719A amino acid mutation; BRAF Exon 11 p.G469V amino acid mutation. Variants of uncertain significance mutations in MET, STK11    Assessment:     NSCLC / poorly differentiated adenocarcinoma; Stage IB pT2a pN0 (0/8)   - NGS: , PD-L1 2%; MSI-stable, EGFR Exon 18 p.G719A amino acid mutation; BRAF Exon 11 p.G469V amino acid mutation. Variants of uncertain significance mutations in MET, STK11  - s/p RUL lobectomy 2022  - clinical trial STRATA [place] discussed and not recruiting now      Plan:     - Labs reviewed ok to proceed with cycle 2  pemetrexed/carboplatin  - c/w folic acid daily  - RTC 3 weeks for cycle 3  - CBC CMP mag Phosphorus        The patient was seen, interviewed and examined. Pertinent lab and radiology studies were reviewed. Pt instructed to call should develop concerning signs/symptoms or have further questions.         Stacie Angel, FNP-C  Hematology / Oncology

## 2023-03-15 ENCOUNTER — OFFICE VISIT (OUTPATIENT)
Dept: HEMATOLOGY/ONCOLOGY | Facility: CLINIC | Age: 63
End: 2023-03-15
Payer: COMMERCIAL

## 2023-03-15 VITALS
OXYGEN SATURATION: 99 % | DIASTOLIC BLOOD PRESSURE: 75 MMHG | SYSTOLIC BLOOD PRESSURE: 115 MMHG | TEMPERATURE: 98 F | BODY MASS INDEX: 21.91 KG/M2 | HEART RATE: 80 BPM | HEIGHT: 65 IN | WEIGHT: 131.5 LBS | RESPIRATION RATE: 18 BRPM

## 2023-03-15 DIAGNOSIS — Z51.11 ENCOUNTER FOR ANTINEOPLASTIC CHEMOTHERAPY: ICD-10-CM

## 2023-03-15 DIAGNOSIS — C34.91 NSCLC OF RIGHT LUNG: ICD-10-CM

## 2023-03-15 PROCEDURE — 3074F PR MOST RECENT SYSTOLIC BLOOD PRESSURE < 130 MM HG: ICD-10-PCS | Mod: CPTII,,,

## 2023-03-15 PROCEDURE — 99215 OFFICE O/P EST HI 40 MIN: CPT | Mod: ,,,

## 2023-03-15 PROCEDURE — 3008F BODY MASS INDEX DOCD: CPT | Mod: CPTII,,,

## 2023-03-15 PROCEDURE — 3008F PR BODY MASS INDEX (BMI) DOCUMENTED: ICD-10-PCS | Mod: CPTII,,,

## 2023-03-15 PROCEDURE — 3078F DIAST BP <80 MM HG: CPT | Mod: CPTII,,,

## 2023-03-15 PROCEDURE — 1159F MED LIST DOCD IN RCRD: CPT | Mod: CPTII,,,

## 2023-03-15 PROCEDURE — 3078F PR MOST RECENT DIASTOLIC BLOOD PRESSURE < 80 MM HG: ICD-10-PCS | Mod: CPTII,,,

## 2023-03-15 PROCEDURE — 1159F PR MEDICATION LIST DOCUMENTED IN MEDICAL RECORD: ICD-10-PCS | Mod: CPTII,,,

## 2023-03-15 PROCEDURE — 99215 PR OFFICE/OUTPT VISIT, EST, LEVL V, 40-54 MIN: ICD-10-PCS | Mod: ,,,

## 2023-03-15 PROCEDURE — 3074F SYST BP LT 130 MM HG: CPT | Mod: CPTII,,,

## 2023-03-15 RX ORDER — HEPARIN 100 UNIT/ML
500 SYRINGE INTRAVENOUS
Status: CANCELLED | OUTPATIENT
Start: 2023-03-15

## 2023-03-15 RX ORDER — DEXAMETHASONE 4 MG/1
8 TABLET ORAL SEE ADMIN INSTRUCTIONS
Qty: 24 TABLET | Refills: 5 | Status: SHIPPED | OUTPATIENT
Start: 2023-03-15 | End: 2023-09-11

## 2023-03-15 RX ORDER — SODIUM CHLORIDE 0.9 % (FLUSH) 0.9 %
10 SYRINGE (ML) INJECTION
Status: CANCELLED | OUTPATIENT
Start: 2023-03-15

## 2023-03-15 NOTE — PROGRESS NOTES
PROBLEM:  - Non-small cell bronchogenic carcinoma    HxPI:  62-year-old  female found to have on routine CT screen to have mass involving right upper lung field proven to be bronchogenic carcinoma, adenocarcinoma subtype.  Staging workup revealed no evidence of extrathoracic disease.  Patient underwent thoracotomy and right upper lobectomy on 2022.  And final path revealed a 3.5 x 2.5 x 1.2 cm adenocarcinoma that was poorly differentiated.  There was no evidence of lymph node involvement.  Margins were free of residual disease.  Final stage was 1B.  Patient referred to our service for recommendations regarding possible adjuvant treatment.      INTERVAL HISTORY:   Patient returns for follow-up NSCLC. She is tolerating alimta/carbo w/o significant SEs. She feels well and appetite is good. Denies any chest pain, shortness her breath, n/v/d/c, abnormal bleeding issues, fever, dysuria, mouth sores, or rash.   Denies any numbness or tingling in the extremities.      SOCIAL Hx:  .  Had 4 children, but 1  from motor vehicle accident.  Patient is a corporate .  CHILDHOOD Hx:  Negative for rheumatic fever.  Patient usual childhood illnesses.  FAMILY Hx:  Negative for malignancies or hematological disorders.  Family history positive for various relatives with AODM, heart disease, visual impairment.  ALLERGIES:  PCN  HABITS:  30+ pack year smoking history.  Patient was cutting down and is consuming less than 1/4 pack of cigarettes per day at the time of this evaluation postoperatively.  ADULT MEDICAL PROBLEMS:  No long-term major medical problems  SURGICAL Hx:  Status post right upper lobectomy, prior bronchoscopy,  section x 2  MEDICATIONS:  Claritin, Pepcid.    Review of Systems   Respiratory:  Negative for cough and shortness of breath.    Cardiovascular:  Negative for chest pain.   Gastrointestinal:  Negative for abdominal pain and diarrhea.   Genitourinary:   Negative for frequency.   Musculoskeletal:  Negative for back pain.   Skin:  Negative for rash.   Neurological:  Negative for headaches.   Psychiatric/Behavioral:  The patient is not nervous/anxious.       Vitals:    03/15/23 0931   BP: 115/75   Pulse: 80   Resp: 18   Temp: 98.2 °F (36.8 °C)            Physical Exam        LABS / IMAGIN/14/23 CMP unremarkable, wbc 5.18, hgb 12.2, plt 283, ANC 4.47  23 CMP and CBC unremarkable, mag 1.9, phosphorus 4.7  23: CBC and CMP WNL, mag 2.0, phosphorus 3.8  - 2023:  HCV nonreactive.  Hep B surface antigen nonreactive, hep B core antibody negative, HIV nonreactive, WBC 7.45, hemoglobin 13.5, platelets 270, creatinine 0.63, AST 17, ALT 11, alkaline phosphatase 63, bilirubin 0.4      - 9/15/2022 PET: Right upper lung lobe mass opacity with extensive spiculations and avid hypermetabolism (measures 2.0 x 4.2 cm) is of high concern for primary lung carcinoma.  This extends further in the apical portion with thick reticulations reaching the medial and lateral pleural surface on image 61 series 3 and also inferiorly on image 68 series 3. No metastatic findings.       PATHOLOGY:      - 2022: Lobectomy Right: 3.5 cm Invasive solid adenocarcinoma, G3, poorly differentiated. No visceral Pleura Invasion or Lymphovascular Invasion & all margins negative for invasive carcinoma. Lymph Node 0/8   STAGE pT2a pN0   - NGS: , PD-L1 2%; MSI-stable, EGFR Exon 18 p.G719A amino acid mutation; BRAF Exon 11 p.G469V amino acid mutation. Variants of uncertain significance mutations in MET, STK11    Assessment:     NSCLC / poorly differentiated adenocarcinoma; Stage IB pT2a pN0 (0/8)   - NGS: , PD-L1 2%; MSI-stable, EGFR Exon 18 p.G719A amino acid mutation; BRAF Exon 11 p.G469V amino acid mutation. Variants of uncertain significance mutations in MET, STK11  - s/p RUL lobectomy 2022  - clinical trial STRATA [place] discussed and not recruiting now      Plan:     - Labs  reviewed ok to proceed with cycle 3 pemetrexed/carboplatin  - c/w folic acid daily  - RTC 3 weeks for cycle 4  - CBC CMP mag Phosphorus        The patient was seen, interviewed and examined. Pertinent lab and radiology studies were reviewed. Pt instructed to call should develop concerning signs/symptoms or have further questions.         Stacie Angel, FNP-C  Hematology / Oncology

## 2023-04-05 ENCOUNTER — OFFICE VISIT (OUTPATIENT)
Dept: HEMATOLOGY/ONCOLOGY | Facility: CLINIC | Age: 63
End: 2023-04-05
Payer: COMMERCIAL

## 2023-04-05 VITALS
TEMPERATURE: 98 F | WEIGHT: 131.69 LBS | BODY MASS INDEX: 21.92 KG/M2 | RESPIRATION RATE: 20 BRPM | DIASTOLIC BLOOD PRESSURE: 72 MMHG | HEART RATE: 80 BPM | OXYGEN SATURATION: 96 % | SYSTOLIC BLOOD PRESSURE: 111 MMHG

## 2023-04-05 DIAGNOSIS — C34.91 NSCLC OF RIGHT LUNG: Primary | ICD-10-CM

## 2023-04-05 DIAGNOSIS — Z51.11 ENCOUNTER FOR ANTINEOPLASTIC CHEMOTHERAPY: ICD-10-CM

## 2023-04-05 PROCEDURE — 3008F BODY MASS INDEX DOCD: CPT | Mod: CPTII,,, | Performed by: INTERNAL MEDICINE

## 2023-04-05 PROCEDURE — 99215 PR OFFICE/OUTPT VISIT, EST, LEVL V, 40-54 MIN: ICD-10-PCS | Mod: ,,, | Performed by: INTERNAL MEDICINE

## 2023-04-05 PROCEDURE — 3078F PR MOST RECENT DIASTOLIC BLOOD PRESSURE < 80 MM HG: ICD-10-PCS | Mod: CPTII,,, | Performed by: INTERNAL MEDICINE

## 2023-04-05 PROCEDURE — 1160F PR REVIEW ALL MEDS BY PRESCRIBER/CLIN PHARMACIST DOCUMENTED: ICD-10-PCS | Mod: CPTII,,, | Performed by: INTERNAL MEDICINE

## 2023-04-05 PROCEDURE — 99215 OFFICE O/P EST HI 40 MIN: CPT | Mod: ,,, | Performed by: INTERNAL MEDICINE

## 2023-04-05 PROCEDURE — 3008F PR BODY MASS INDEX (BMI) DOCUMENTED: ICD-10-PCS | Mod: CPTII,,, | Performed by: INTERNAL MEDICINE

## 2023-04-05 PROCEDURE — 3078F DIAST BP <80 MM HG: CPT | Mod: CPTII,,, | Performed by: INTERNAL MEDICINE

## 2023-04-05 PROCEDURE — 3074F SYST BP LT 130 MM HG: CPT | Mod: CPTII,,, | Performed by: INTERNAL MEDICINE

## 2023-04-05 PROCEDURE — 1159F PR MEDICATION LIST DOCUMENTED IN MEDICAL RECORD: ICD-10-PCS | Mod: CPTII,,, | Performed by: INTERNAL MEDICINE

## 2023-04-05 PROCEDURE — 1160F RVW MEDS BY RX/DR IN RCRD: CPT | Mod: CPTII,,, | Performed by: INTERNAL MEDICINE

## 2023-04-05 PROCEDURE — 3074F PR MOST RECENT SYSTOLIC BLOOD PRESSURE < 130 MM HG: ICD-10-PCS | Mod: CPTII,,, | Performed by: INTERNAL MEDICINE

## 2023-04-05 PROCEDURE — 1159F MED LIST DOCD IN RCRD: CPT | Mod: CPTII,,, | Performed by: INTERNAL MEDICINE

## 2023-04-05 NOTE — PROGRESS NOTES
PROBLEM:  - Non-small cell bronchogenic carcinoma    HxPI:  62-year-old  female found to have on routine CT screen to have mass involving right upper lung field proven to be bronchogenic carcinoma, adenocarcinoma subtype.  Staging workup revealed no evidence of extrathoracic disease.  Patient underwent thoracotomy and right upper lobectomy on 2022.  And final path revealed a 3.5 x 2.5 x 1.2 cm adenocarcinoma that was poorly differentiated.  There was no evidence of lymph node involvement.  Margins were free of residual disease.  Final stage was 1B.  Patient referred to our service for recommendations regarding possible adjuvant treatment.  INTERVAL HISTORY:   Patient returns for follow-up NSCLC. She is tolerating alimta/carbo w/o significant SEs. She feels well and appetite is good. Denies any chest pain, shortness her breath, n/v/d/c, abnormal bleeding issues, fever, dysuria, mouth sores, or rash.   Denies any numbness or tingling in the extremities.    2023, follow-up visit.  Mrs. Hanson comes in for a pre chemotherapy evaluation, adjuvant in type, with carbo platinum methotrexate following the resection of a right upper lobe mass.  This is her last treatment, she has done well, she is very conscious of her blood sugar her  and her well-being.  She was not a heavy smoker, therefore the histology of adenocarcinoma is consistent with a short smoking history.      Review of Systems   Respiratory:  Negative for cough and shortness of breath.    Cardiovascular:  Negative for chest pain.   Gastrointestinal:  Negative for abdominal pain and diarrhea.   Genitourinary:  Negative for frequency.   Musculoskeletal:  Negative for back pain.   Skin:  Negative for rash.   Neurological:  Negative for headaches.   Psychiatric/Behavioral:  The patient is not nervous/anxious.       SOCIAL Hx:  .  Had 4 children, but 1  from motor vehicle accident.  Patient is a corporate human resource  officer.  CHILDHOOD Hx:  Negative for rheumatic fever.  Patient usual childhood illnesses.  FAMILY Hx:  Negative for malignancies or hematological disorders.  Family history positive for various relatives with AODM, heart disease, visual impairment.  ALLERGIES:  PCN  HABITS:  30+ pack year smoking history.  Patient was cutting down and is consuming less than 1/4 pack of cigarettes per day at the time of this evaluation postoperatively.  ADULT MEDICAL PROBLEMS:  No long-term major medical problems  SURGICAL Hx:  Status post right upper lobectomy, prior bronchoscopy,  section x 2  MEDICATIONS:  Claritin, Pepcid.         Physical Exam  HENT:      Head: Normocephalic.   Cardiovascular:      Rate and Rhythm: Normal rate.   Pulmonary:      Effort: Pulmonary effort is normal.   Abdominal:      General: Abdomen is flat.   Musculoskeletal:      Cervical back: Normal range of motion.   Neurological:      General: No focal deficit present.      Mental Status: She is alert.   Psychiatric:         Mood and Affect: Mood normal.           LABS / IMAGIN/27/2023:  HCV nonreactive.  Hep B surface antigen nonreactive, hep B core antibody negative, HIV nonreactive, WBC 7.45, hemoglobin 13.5, platelets 270, creatinine 0.63, AST 17, ALT 11, alkaline phosphatase 63, bilirubin 0.4  9/15/2022 PET: Right upper lung lobe mass opacity with extensive spiculations and avid hypermetabolism (measures 2.0 x 4.2 cm) is of high concern for primary lung carcinoma.  This extends further in the apical portion with thick reticulations reaching the medial and lateral pleural surface on image 61 series 3 and also inferiorly on image 68 series 3. No metastatic findings.       PATHOLOGY:      - 2022: Lobectomy Right: 3.5 cm Invasive solid adenocarcinoma, G3, poorly differentiated. No visceral Pleura Invasion or Lymphovascular Invasion & all margins negative for invasive carcinoma. Lymph Node 0/8   STAGE pT2a pN0   - NGS: , PD-L1 2%;  MSI-stable, EGFR Exon 18 p.G719A amino acid mutation; BRAF Exon 11 p.G469V amino acid mutation. Variants of uncertain significance mutations in MET, STK11    Assessment:     NSCLC / poorly differentiated adenocarcinoma; Stage IB pT2a pN0 (0/8)   - NGS: , PD-L1 2%; MSI-stable, EGFR Exon 18 p.G719A amino acid mutation; BRAF Exon 11 p.G469V amino acid mutation. Variants of uncertain significance mutations in MET, STK11  - s/p RUL lobectomy 11/2022  - clinical trial STRATA [place] discussed and not recruiting now      Plan:     - Labs reviewed ok to proceed with cycle 3 pemetrexed/carboplatin  - c/w folic acid daily  -the 4th and last course of adjuvant chemotherapy will be completed today.  We will begin to monitor her, in June, I will see her before I leave and obtain a CT scan of the chest abdomen before the visit.  - CBC CMP mag Phosphorus    Cezar Bond MD

## 2023-06-07 ENCOUNTER — OFFICE VISIT (OUTPATIENT)
Dept: HEMATOLOGY/ONCOLOGY | Facility: CLINIC | Age: 63
End: 2023-06-07
Payer: COMMERCIAL

## 2023-06-07 VITALS
TEMPERATURE: 98 F | SYSTOLIC BLOOD PRESSURE: 135 MMHG | BODY MASS INDEX: 22.04 KG/M2 | WEIGHT: 132.31 LBS | HEART RATE: 86 BPM | RESPIRATION RATE: 20 BRPM | HEIGHT: 65 IN | DIASTOLIC BLOOD PRESSURE: 85 MMHG | OXYGEN SATURATION: 99 %

## 2023-06-07 DIAGNOSIS — C34.91 NSCLC OF RIGHT LUNG: ICD-10-CM

## 2023-06-07 DIAGNOSIS — R59.0 MEDIASTINAL ADENOPATHY: Primary | ICD-10-CM

## 2023-06-07 PROCEDURE — 3008F PR BODY MASS INDEX (BMI) DOCUMENTED: ICD-10-PCS | Mod: CPTII,,, | Performed by: INTERNAL MEDICINE

## 2023-06-07 PROCEDURE — 1159F MED LIST DOCD IN RCRD: CPT | Mod: CPTII,,, | Performed by: INTERNAL MEDICINE

## 2023-06-07 PROCEDURE — 3079F DIAST BP 80-89 MM HG: CPT | Mod: CPTII,,, | Performed by: INTERNAL MEDICINE

## 2023-06-07 PROCEDURE — 3008F BODY MASS INDEX DOCD: CPT | Mod: CPTII,,, | Performed by: INTERNAL MEDICINE

## 2023-06-07 PROCEDURE — 3079F PR MOST RECENT DIASTOLIC BLOOD PRESSURE 80-89 MM HG: ICD-10-PCS | Mod: CPTII,,, | Performed by: INTERNAL MEDICINE

## 2023-06-07 PROCEDURE — 3075F SYST BP GE 130 - 139MM HG: CPT | Mod: CPTII,,, | Performed by: INTERNAL MEDICINE

## 2023-06-07 PROCEDURE — 99215 OFFICE O/P EST HI 40 MIN: CPT | Mod: ,,, | Performed by: INTERNAL MEDICINE

## 2023-06-07 PROCEDURE — 1160F PR REVIEW ALL MEDS BY PRESCRIBER/CLIN PHARMACIST DOCUMENTED: ICD-10-PCS | Mod: CPTII,,, | Performed by: INTERNAL MEDICINE

## 2023-06-07 PROCEDURE — 99215 PR OFFICE/OUTPT VISIT, EST, LEVL V, 40-54 MIN: ICD-10-PCS | Mod: ,,, | Performed by: INTERNAL MEDICINE

## 2023-06-07 PROCEDURE — 1160F RVW MEDS BY RX/DR IN RCRD: CPT | Mod: CPTII,,, | Performed by: INTERNAL MEDICINE

## 2023-06-07 PROCEDURE — 1159F PR MEDICATION LIST DOCUMENTED IN MEDICAL RECORD: ICD-10-PCS | Mod: CPTII,,, | Performed by: INTERNAL MEDICINE

## 2023-06-07 PROCEDURE — 3075F PR MOST RECENT SYSTOLIC BLOOD PRESS GE 130-139MM HG: ICD-10-PCS | Mod: CPTII,,, | Performed by: INTERNAL MEDICINE

## 2023-06-07 NOTE — PROGRESS NOTES
PROBLEM:  - Non-small cell bronchogenic carcinoma    HxPI:  62-year-old  female found to have on routine CT screen to have mass involving right upper lung field proven to be bronchogenic carcinoma, adenocarcinoma subtype.  Staging workup revealed no evidence of extrathoracic disease.  Patient underwent thoracotomy and right upper lobectomy on 2022.  And final path revealed a 3.5 x 2.5 x 1.2 cm adenocarcinoma that was poorly differentiated.  There was no evidence of lymph node involvement.  Margins were free of residual disease.  Final stage was 1B.  Patient referred to our service for recommendations regarding possible adjuvant treatment.  INTERVAL HISTORY:   Patient returns for follow-up NSCLC. She is tolerating alimta/carbo w/o significant SEs. She feels well and appetite is good. Denies any chest pain, shortness her breath, n/v/d/c, abnormal bleeding issues, fever, dysuria, mouth sores, or rash.   Denies any numbness or tingling in the extremities.    2023, follow-up visit.  Mrs. Hanson comes in for a pre chemotherapy evaluation, adjuvant in type, with carbo platinum methotrexate following the resection of a right upper lobe mass.  This is her last treatment, she has done well, she is very conscious of her blood sugar her diet  and her well-being.  She was not a heavy smoker, therefore the histology of adenocarcinoma is consistent with a short smoking history.      Review of Systems   Respiratory:  Negative for cough and shortness of breath.    Cardiovascular:  Negative for chest pain.   Gastrointestinal:  Negative for abdominal pain and diarrhea.   Genitourinary:  Negative for frequency.   Musculoskeletal:  Negative for back pain.   Skin:  Negative for rash.   Neurological:  Negative for headaches.   Psychiatric/Behavioral:  The patient is not nervous/anxious.       SOCIAL Hx:  .  Had 4 children, but 1  from motor vehicle accident.  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  CHILDHOOD Hx:  Negative for rheumatic fever.  Patient usual childhood illnesses.  FAMILY Hx:  Negative for malignancies or hematological disorders.  Family history positive for various relatives with AODM, heart disease, visual impairment.  ALLERGIES:  PCN  HABITS:  30+ pack year smoking history.  Patient was cutting down and is consuming less than 1/4 pack of cigarettes per day at the time of this evaluation postoperatively.  ADULT MEDICAL PROBLEMS:  No long-term major medical problems  SURGICAL Hx:  Status post right upper lobectomy, prior bronchoscopy,  section x 2  MEDICATIONS:  Claritin, Pepcid.       Blood pressure 135/85, pulse 86, temperature 98.2 °F (36.8 °C), temperature source Oral, resp. rate 20, height 5' 5" (1.651 m), weight 60 kg (132 lb 4.8 oz), SpO2 99 %.   Physical Exam  HENT:      Head: Normocephalic.   Cardiovascular:      Rate and Rhythm: Normal rate.   Pulmonary:      Effort: Pulmonary effort is normal.   Abdominal:      General: Abdomen is flat.   Musculoskeletal:      Cervical back: Normal range of motion.   Neurological:      General: No focal deficit present.      Mental Status: She is alert.   Psychiatric:         Mood and Affect: Mood normal.    Thoracotomy scar from resection of the right upper lobe mass       LABS / IMAGING:   Lab work dated 2023:  CBC totally normal except for a mild elevation of the MCV.  CMP totally normal, including liver enzymes renal function, albumin 4.2 calcium normal and sore the electrolytes.   2023:  HCV nonreactive.  Hep B surface antigen nonreactive, hep B core antibody negative, HIV nonreactive, WBC 7.45, hemoglobin 13.5, platelets 270, creatinine 0.63, AST 17, ALT 11, alkaline phosphatase 63, bilirubin 0.4  Imaging  CT of the chest abdomen and pelvis dated 2023, there are no prior images for comparison.  There is evidence of a right upper lobectomy, with scar in the right suprahilar region noted.  No pulmonary nodules " or consolidation or pleural effusions are identified.  There is a mediastinal node in the subcarinal space   measuring 16 x 11 mm .  There are no findings to suggest recurrence in the CT of the abdomen and pelvis.  There is cholelithiasis without cholecystitis by CT.                               9/15/2022 PET: Right upper lung lobe mass opacity with extensive spiculations and avid hypermetabolism (measures 2.0 x 4.2 cm) is of high concern for primary lung carcinoma.  This extends further in the apical portion with thick reticulations reaching the medial and lateral pleural surface on image 61 series 3 and also inferiorly on image 68 series 3. No metastatic findings.       PATHOLOGY:      - 11/2022: Lobectomy Right: 3.5 cm Invasive solid adenocarcinoma, G3, poorly differentiated. No visceral Pleura Invasion or Lymphovascular Invasion & all margins negative for invasive carcinoma. Lymph Node 0/8   STAGE pT2a pN0   - NGS: , PD-L1 2%; MSI-stable, EGFR Exon 18 p.G719A amino acid mutation; BRAF Exon 11 p.G469V amino acid mutation. Variants of uncertain significance mutations in MET, STK11    Assessment:     NSCLC / poorly differentiated adenocarcinoma; Stage IB pT2a pN0 (0/8)   - NGS: , PD-L1 2%; MSI-stable, EGFR Exon 18 p.G719A amino acid mutation; BRAF Exon 11 p.G469V amino acid mutation. Variants of uncertain significance mutations in MET, STK11  - s/p RUL lobectomy 11/2022  - clinical trial STRATA [place] discussed and not recruiting now      Plan:     CT of the chest reviewed, revealing the small subcarinal node.  Patient shown pictures on the Internet of the location of the tumor in the subcarinal region.    I will follow up, the evolution of this mediastinal node with a PET scan in 3 months. In [facility]  location   Patient informed, and she was extensively reassured.  - c/w folic acid daily  She completed 4 courses of adjuvant chemotherapy with carboplatin and pemetrexed  Return to clinic 2nd week of  September.      Cezar Bond MD

## 2023-08-23 DIAGNOSIS — C34.11 MALIGNANT NEOPLASM OF UPPER LOBE OF RIGHT LUNG: ICD-10-CM

## 2023-08-24 RX ORDER — FOLIC ACID 1 MG/1
1000 TABLET ORAL
Qty: 30 TABLET | Refills: 0 | Status: SHIPPED | OUTPATIENT
Start: 2023-08-24

## 2023-09-07 ENCOUNTER — HOSPITAL ENCOUNTER (OUTPATIENT)
Dept: RADIOLOGY | Facility: HOSPITAL | Age: 63
Discharge: HOME | End: 2023-09-07
Attending: INTERNAL MEDICINE
Payer: COMMERCIAL

## 2023-09-07 DIAGNOSIS — C34.91 NSCLC OF RIGHT LUNG: ICD-10-CM

## 2023-09-07 DIAGNOSIS — R59.0 MEDIASTINAL ADENOPATHY: ICD-10-CM

## 2023-09-07 PROCEDURE — A9552 F18 FDG: HCPCS

## 2023-09-08 NOTE — PROGRESS NOTES
Previous oncologist: Dr. Arias, Dr. Hewitt and Dr. Bond.       Diagnosis:  Right Lung adenocarcinoma, poorly differentiated, Stage IB pT2a pN0 (0/8)       Treatment history   23-23:  Adjuvant carboplatin pemetrexed x4 cycles    HPI:  62-year-old  female found to have on routine CT screen to have mass involving right upper lung field proven to be bronchogenic carcinoma, adenocarcinoma subtype.  Staging workup revealed no evidence of extrathoracic disease.  Patient underwent thoracotomy and right upper lobectomy on 2022.  And final path revealed a 3.5 x 2.5 x 1.2 cm adenocarcinoma that was poorly differentiated.  There was no evidence of lymph node involvement.  Margins were free of residual disease.  Final stage was 1B.  Patient referred to our service for recommendations regarding possible adjuvant treatment.      Interval history   2023 , patient denies any acute concerns today.  She denies any chest pain, shortness of breath, decreased appetite, weight loss or new foci of pain.  She denies any headaches, vision changes or focal weakness.           Past Medical History:   Diagnosis Date    Allergy     Cholelithiases     Lung cancer     Lung mass     Macular degeneration        Past Surgical History:   Procedure Laterality Date     SECTION N/A     LUNG LOBECTOMY  2022    Procedure: LOBECTOMY, LUNG;  Surgeon: Topher Gaston IV, MD;  Location: [facility] OR;  Service: Cardiothoracic;;    NAVIGATIONAL BRONCHOSCOPY N/A 2022    Procedure: BRONCHOSCOPY, NAVIGATIONAL;  Surgeon: Brett Rosario MD;  Location: [facility] BRONCHOSCOPY LAB;  Service: Pulmonary;  Laterality: N/A;    TUBAL LIGATION         Family History   Problem Relation Age of Onset    Heart defect Father     Diabetes Maternal Aunt     Diabetes Maternal Grandmother     Vision loss Maternal Grandmother     Heart disease Paternal Grandfather        Social History     Socioeconomic History    Marital status:     Tobacco Use    Smoking status: Former     Current packs/day: 0.00     Average packs/day: 0.3 packs/day for 35.0 years (8.8 ttl pk-yrs)     Types: Cigarettes     Start date: 1987     Quit date: 2022     Years since quittin.8    Smokeless tobacco: Never    Tobacco comments:     Now smoking 1-3 cigarettes per day   Substance and Sexual Activity    Alcohol use: Yes     Alcohol/week: 6.0 standard drinks of alcohol     Types: 6 Cans of beer per week     Comment: occassionally    Drug use: Never    Sexual activity: Not Currently     Partners: Male     Birth control/protection: Post-menopausal       Current Outpatient Medications   Medication Sig Dispense Refill    acetaminophen (TYLENOL) 325 MG tablet Take 325 mg by mouth every 6 (six) hours as needed for Pain.      b complex vitamins tablet Take 1 tablet by mouth once daily.      echinacea purpurea extract (ECHINACEA) 125 mg Tab Take 1 tablet by mouth once daily.      famotidine (PEPCID) 20 MG tablet Take 20 mg by mouth once daily.      loratadine (CLARITIN) 10 mg tablet Take 10 mg by mouth once daily.      vitamin D (VITAMIN D3) 1000 units Tab Take 1,000 Units by mouth once daily.      folic acid (FOLVITE) 1 MG tablet Take 1 tablet by mouth once daily. (Patient not taking: Reported on 2023) 30 tablet 0     No current facility-administered medications for this visit.       Review of patient's allergies indicates:   Allergen Reactions    Chlorine     Monosodium glutamate Other (See Comments) and Swelling    Msg [glutamic acid] Other (See Comments) and Edema     Migraine headache    Amoxicillin     Penicillin     Penicillins Rash     Review of systems  CONSTITUTIONAL: no fevers, no chills, no weight loss, no fatigue, no weakness  HEMATOLOGIC: no abnormal bleeding, no abnormal bruising, no drenching night sweats  ONCOLOGIC: no new masses or lumps  HEENT: no vision loss, no tinnitus or hearing loss, no nose bleeding, no dysphagia, no odynophagia  CVS: no  "chest pain, no palpitations, no dyspnea on exertion  RESP: no shortness of breath, no hemoptysis, no cough  GI: no nausea, no vomiting, no diarrhea, no constipation, no melena, no hematochezia, no hematemesis, no abdominal pain, no increase in abdominal girth  : no dysuria, no hematuria, no hesitancy, no scrotal swelling, no discharge  INTEGUMENT: no rashes, no abnormal bruising, no nail pitting, no hyperpigmentation  NEURO: no falls, no memory loss, no paresthesias or dysesthesias, no urofecal incontinence or retention, no loss of strength on any extremity  MSK: no back pain, no new joint pain, no joint swelling  PSYCH: no suicidal or homicidal ideation, no depression, no insomnia, no anhedonia  ENDOCRINE: no heat or cold intolerance, no polyuria, no polydipsia         Blood pressure 123/76, pulse 82, temperature 98.7 °F (37.1 °C), temperature source Oral, resp. rate 18, height 5' 5" (1.651 m), weight 58.5 kg (129 lb), SpO2 95 %.     GA: AAOx3, NAD  HEENT: NCAT, PERRLA, EOMI, good dentition, moist oral mucous membranes  LYMPH: no cervical, axillary or supraclavicular adenopathy  CVS: s1s2 RRR, no M/R/G  RESP: CTA b/l, no crackles, no wheezes or rhonchi  ABD: soft, NT, ND, BS+, no hepatosplenomegaly  EXT: no deformities, no pedal edema  SKIN: no rashes, warm and dry  NEURO: normal mentation, strength 5/5 on all 4 extremities, no sensory deficits       LABS / IMAGIN2023 CBC unremarkable, CMP unremarkable.  .    Lab work dated 2023:  CBC totally normal except for a mild elevation of the MCV.  CMP totally normal, including liver enzymes renal function, albumin 4.2 calcium normal and sore the electrolytes.   2023:  HCV nonreactive.  Hep B surface antigen nonreactive, hep B core antibody negative, HIV nonreactive, WBC 7.45, hemoglobin 13.5, platelets 270, creatinine 0.63, AST 17, ALT 11, alkaline phosphatase 63, bilirubin 0.4      Imaging    2023 PET-CT:  Status post right upper " lobectomy with no PET evidence of recurrent disease.  Tiny left lower lobe nodule can be followed on surveillance scans.    CT of the chest abdomen and pelvis dated 06/01/2023, there are no prior images for comparison.  There is evidence of a right upper lobectomy, with scar in the right suprahilar region noted.  No pulmonary nodules or consolidation or pleural effusions are identified.  There is a mediastinal node in the subcarinal space   measuring 16 x 11 mm .  There are no findings to suggest recurrence in the CT of the abdomen and pelvis.  There is cholelithiasis without cholecystitis by CT.                                 9/15/2022 PET: Right upper lung lobe mass opacity with extensive spiculations and avid hypermetabolism (measures 2.0 x 4.2 cm) is of high concern for primary lung carcinoma.  This extends further in the apical portion with thick reticulations reaching the medial and lateral pleural surface on image 61 series 3 and also inferiorly on image 68 series 3. No metastatic findings.       PATHOLOGY:      - 11/2022: Lobectomy Right: 3.5 cm Invasive solid adenocarcinoma, G3, poorly differentiated. No visceral Pleura Invasion or Lymphovascular Invasion & all margins negative for invasive carcinoma. Lymph Node 0/8   STAGE pT2a pN0   - NGS: , PD-L1 2%; MSI-stable, EGFR Exon 18 p.G719A amino acid mutation; BRAF Exon 11 p.G469V amino acid mutation. Variants of uncertain significance mutations in MET, STK11    Assessment:     NSCLC / poorly differentiated adenocarcinoma; Stage IB pT2a pN0 (0/8)   - NGS: , PD-L1 2%; MSI-stable, EGFR Exon 18 p.G719A amino acid mutation; BRAF Exon 11 p.G469V amino acid mutation. Variants of uncertain significance mutations in MET, STK11  - s/p RUL lobectomy 11/2022  - clinical trial STRATA [place] discussed and not recruiting now  - status post 4 cycles of adjuvant carboplatin plus pemetrexed  -PET-CT in September 2023 without evidence of disease recurrence/metastatic  disease.    Plan:   Plan to follow-up in 6 months with repeat CBC, CMP, CT chest with contrast for surveillance for stage I disease.    A total of  30 minutes were spent in review of records, interpretation of test, coordination of care, discussion and counseling with the patient.          Portions of the record may have been created with voice recognition software. Occasional wrong-word or sound-a-like substitutions may have occurred due to the inherent limitations of voice recognition software. Read the chart carefully and recognize, using context, where substitutions have occurred.

## 2023-09-11 ENCOUNTER — OFFICE VISIT (OUTPATIENT)
Dept: HEMATOLOGY/ONCOLOGY | Facility: CLINIC | Age: 63
End: 2023-09-11
Payer: COMMERCIAL

## 2023-09-11 VITALS
SYSTOLIC BLOOD PRESSURE: 123 MMHG | BODY MASS INDEX: 21.49 KG/M2 | WEIGHT: 129 LBS | OXYGEN SATURATION: 95 % | HEART RATE: 82 BPM | RESPIRATION RATE: 18 BRPM | HEIGHT: 65 IN | DIASTOLIC BLOOD PRESSURE: 76 MMHG | TEMPERATURE: 99 F

## 2023-09-11 DIAGNOSIS — C34.91 NSCLC OF RIGHT LUNG: Primary | ICD-10-CM

## 2023-09-11 PROCEDURE — 3008F BODY MASS INDEX DOCD: CPT | Mod: CPTII,,, | Performed by: STUDENT IN AN ORGANIZED HEALTH CARE EDUCATION/TRAINING PROGRAM

## 2023-09-11 PROCEDURE — 3074F PR MOST RECENT SYSTOLIC BLOOD PRESSURE < 130 MM HG: ICD-10-PCS | Mod: CPTII,,, | Performed by: STUDENT IN AN ORGANIZED HEALTH CARE EDUCATION/TRAINING PROGRAM

## 2023-09-11 PROCEDURE — 3078F PR MOST RECENT DIASTOLIC BLOOD PRESSURE < 80 MM HG: ICD-10-PCS | Mod: CPTII,,, | Performed by: STUDENT IN AN ORGANIZED HEALTH CARE EDUCATION/TRAINING PROGRAM

## 2023-09-11 PROCEDURE — 99214 PR OFFICE/OUTPT VISIT, EST, LEVL IV, 30-39 MIN: ICD-10-PCS | Mod: ,,, | Performed by: STUDENT IN AN ORGANIZED HEALTH CARE EDUCATION/TRAINING PROGRAM

## 2023-09-11 PROCEDURE — 99214 OFFICE O/P EST MOD 30 MIN: CPT | Mod: ,,, | Performed by: STUDENT IN AN ORGANIZED HEALTH CARE EDUCATION/TRAINING PROGRAM

## 2023-09-11 PROCEDURE — 3074F SYST BP LT 130 MM HG: CPT | Mod: CPTII,,, | Performed by: STUDENT IN AN ORGANIZED HEALTH CARE EDUCATION/TRAINING PROGRAM

## 2023-09-11 PROCEDURE — 1159F PR MEDICATION LIST DOCUMENTED IN MEDICAL RECORD: ICD-10-PCS | Mod: CPTII,,, | Performed by: STUDENT IN AN ORGANIZED HEALTH CARE EDUCATION/TRAINING PROGRAM

## 2023-09-11 PROCEDURE — 3078F DIAST BP <80 MM HG: CPT | Mod: CPTII,,, | Performed by: STUDENT IN AN ORGANIZED HEALTH CARE EDUCATION/TRAINING PROGRAM

## 2023-09-11 PROCEDURE — 3008F PR BODY MASS INDEX (BMI) DOCUMENTED: ICD-10-PCS | Mod: CPTII,,, | Performed by: STUDENT IN AN ORGANIZED HEALTH CARE EDUCATION/TRAINING PROGRAM

## 2023-09-11 PROCEDURE — 1159F MED LIST DOCD IN RCRD: CPT | Mod: CPTII,,, | Performed by: STUDENT IN AN ORGANIZED HEALTH CARE EDUCATION/TRAINING PROGRAM

## 2024-03-06 DIAGNOSIS — C34.90 MALIGNANT NEOPLASM OF UNSPECIFIED PART OF UNSPECIFIED BRONCHUS OR LUNG: Primary | ICD-10-CM

## 2024-03-13 DIAGNOSIS — C34.91 NON-SMALL CELL CANCER OF RIGHT LUNG: Primary | ICD-10-CM

## 2024-03-14 ENCOUNTER — OFFICE VISIT (OUTPATIENT)
Dept: HEMATOLOGY/ONCOLOGY | Facility: CLINIC | Age: 64
End: 2024-03-14
Payer: MEDICARE

## 2024-03-14 VITALS
HEIGHT: 65 IN | SYSTOLIC BLOOD PRESSURE: 120 MMHG | TEMPERATURE: 99 F | DIASTOLIC BLOOD PRESSURE: 75 MMHG | OXYGEN SATURATION: 98 % | WEIGHT: 129.19 LBS | BODY MASS INDEX: 21.52 KG/M2 | RESPIRATION RATE: 18 BRPM | HEART RATE: 80 BPM

## 2024-03-14 DIAGNOSIS — Z08 ENCOUNTER FOR FOLLOW-UP SURVEILLANCE OF LUNG CANCER: Primary | ICD-10-CM

## 2024-03-14 DIAGNOSIS — Z85.118 ENCOUNTER FOR FOLLOW-UP SURVEILLANCE OF LUNG CANCER: Primary | ICD-10-CM

## 2024-03-14 PROCEDURE — 1126F AMNT PAIN NOTED NONE PRSNT: CPT | Mod: CPTII,,, | Performed by: NURSE PRACTITIONER

## 2024-03-14 PROCEDURE — 3288F FALL RISK ASSESSMENT DOCD: CPT | Mod: CPTII,,, | Performed by: NURSE PRACTITIONER

## 2024-03-14 PROCEDURE — 1160F RVW MEDS BY RX/DR IN RCRD: CPT | Mod: CPTII,,, | Performed by: NURSE PRACTITIONER

## 2024-03-14 PROCEDURE — 3078F DIAST BP <80 MM HG: CPT | Mod: CPTII,,, | Performed by: NURSE PRACTITIONER

## 2024-03-14 PROCEDURE — 99214 OFFICE O/P EST MOD 30 MIN: CPT | Mod: ,,, | Performed by: NURSE PRACTITIONER

## 2024-03-14 PROCEDURE — 1159F MED LIST DOCD IN RCRD: CPT | Mod: CPTII,,, | Performed by: NURSE PRACTITIONER

## 2024-03-14 PROCEDURE — 3008F BODY MASS INDEX DOCD: CPT | Mod: CPTII,,, | Performed by: NURSE PRACTITIONER

## 2024-03-14 PROCEDURE — 1101F PT FALLS ASSESS-DOCD LE1/YR: CPT | Mod: CPTII,,, | Performed by: NURSE PRACTITIONER

## 2024-03-14 PROCEDURE — 3074F SYST BP LT 130 MM HG: CPT | Mod: CPTII,,, | Performed by: NURSE PRACTITIONER

## 2024-03-14 NOTE — PROGRESS NOTES
Previous oncologist: Dr. Arias, Dr. Hewitt and Dr. Bond.       Diagnosis:  Right Lung adenocarcinoma, poorly differentiated, Stage IB pT2a pN0 (0/8)       Treatment history   23-23:  Adjuvant carboplatin pemetrexed x4 cycles    HPI:  62-year-old  female found to have on routine CT screen to have mass involving right upper lung field proven to be bronchogenic carcinoma, adenocarcinoma subtype.  Staging workup revealed no evidence of extrathoracic disease.  Patient underwent thoracotomy and right upper lobectomy on 2022.  And final path revealed a 3.5 x 2.5 x 1.2 cm adenocarcinoma that was poorly differentiated.  There was no evidence of lymph node involvement.  Margins were free of residual disease.  Final stage was 1B.  Patient referred to our service for recommendations regarding possible adjuvant treatment.      Interval history     3/14/2024:  Ms. Hanson is here today for her 6-month follow-up, labs, and CT scan for her stage IB right lung cancer.  Today, she reports, feeling well.  She does report right parietal headaches with sharp bursts of pain that started 3-4 months ago, 1-2 times a week.  Denies vision changes, weakness, and seizure activity.  She denies any fatigue, cough, hemoptysis, SOB, chest pain, abnormal bleeding, abdominal pain, joint or bone pain, no lower extremity edema, and no peripheral neuropathy.   Labs reviewed with patient dated: 3/12/2024:  CMP WNL.  CBC WNL.  CT chest with contrast dated 3/6/2024:  No evidence of residual or recurrent tumor.  Subcarinal lymph node decreased in size, now normal.  Stable nonspecific left lower lobe pulmonary nodule.  Eating and drinking.  Weight is stable,  No recent hospitalizations, infections, or illnesses.         Past Medical History:   Diagnosis Date    Allergy     Cholelithiases     Lung cancer     Lung mass     Macular degeneration        Past Surgical History:   Procedure Laterality Date     SECTION N/A     LUNG  LOBECTOMY  2022    Procedure: LOBECTOMY, LUNG;  Surgeon: Topher Gaston IV, MD;  Location: [facility] OR;  Service: Cardiothoracic;;    NAVIGATIONAL BRONCHOSCOPY N/A 2022    Procedure: BRONCHOSCOPY, NAVIGATIONAL;  Surgeon: Brett Rosario MD;  Location: [facility] BRONCHOSCOPY LAB;  Service: Pulmonary;  Laterality: N/A;    TUBAL LIGATION         Family History   Problem Relation Age of Onset    Heart defect Father     Diabetes Maternal Aunt     Diabetes Maternal Grandmother     Vision loss Maternal Grandmother     Heart disease Paternal Grandfather        Social History     Socioeconomic History    Marital status:    Tobacco Use    Smoking status: Former     Current packs/day: 0.00     Average packs/day: 0.3 packs/day for 35.0 years (8.8 ttl pk-yrs)     Types: Cigarettes     Start date: 1987     Quit date: 2022     Years since quittin.3    Smokeless tobacco: Never    Tobacco comments:     Now smoking 1-3 cigarettes per day   Substance and Sexual Activity    Alcohol use: Yes     Alcohol/week: 6.0 standard drinks of alcohol     Types: 6 Cans of beer per week     Comment: occassionally    Drug use: Never    Sexual activity: Not Currently     Partners: Male     Birth control/protection: Post-menopausal       Current Outpatient Medications   Medication Sig Dispense Refill    acetaminophen (TYLENOL) 325 MG tablet Take 325 mg by mouth every 6 (six) hours as needed for Pain.      b complex vitamins tablet Take 1 tablet by mouth once daily.      echinacea purpurea extract (ECHINACEA) 125 mg Tab Take 1 tablet by mouth once daily.      famotidine (PEPCID) 20 MG tablet Take 20 mg by mouth once daily.      folic acid (FOLVITE) 1 MG tablet Take 1 tablet by mouth once daily. 30 tablet 0    loratadine (CLARITIN) 10 mg tablet Take 10 mg by mouth once daily.      vitamin D (VITAMIN D3) 1000 units Tab Take 1,000 Units by mouth once daily.       No current facility-administered medications for this visit.  "      Review of patient's allergies indicates:   Allergen Reactions    Chlorine     Monosodium glutamate Other (See Comments) and Swelling    Msg [glutamic acid] Other (See Comments) and Edema     Migraine headache    Amoxicillin     Penicillin     Penicillins Rash     Review of systems  CONSTITUTIONAL: no fevers, no chills, no weight loss, no fatigue, no weakness  HEMATOLOGIC: no abnormal bleeding, no abnormal bruising, no drenching night sweats  ONCOLOGIC: no new masses or lumps  HEENT: no vision loss, no tinnitus or hearing loss, no nose bleeding, no dysphagia, no odynophagia  CVS: no chest pain, no palpitations, no dyspnea on exertion  RESP: no shortness of breath, no hemoptysis, no cough  GI: no nausea, no vomiting, no diarrhea, no constipation, no melena, no hematochezia, no hematemesis, no abdominal pain, no increase in abdominal girth  : no dysuria, no hematuria, no hesitancy, no scrotal swelling, no discharge  INTEGUMENT: no rashes, no abnormal bruising, no nail pitting, no hyperpigmentation  NEURO: no falls, no memory loss, no paresthesias or dysesthesias, no urofecal incontinence or retention, no loss of strength on any extremity  MSK: no back pain, no new joint pain, no joint swelling  PSYCH: no suicidal or homicidal ideation, no depression, no insomnia, no anhedonia  ENDOCRINE: no heat or cold intolerance, no polyuria, no polydipsia         Blood pressure 120/75, pulse 80, temperature 98.7 °F (37.1 °C), temperature source Oral, resp. rate 18, height 5' 5" (1.651 m), weight 58.6 kg (129 lb 3.2 oz), SpO2 98 %.     GA: AAOx3, NAD  HEENT: NCAT, PERRLA, EOMI, good dentition, moist oral mucous membranes  LYMPH: no cervical, axillary or supraclavicular adenopathy  CVS: s1s2 RRR, no M/R/G  RESP: CTA b/l, no crackles, no wheezes or rhonchi  ABD: soft, NT, ND, BS+, no hepatosplenomegaly  EXT: no deformities, no pedal edema  SKIN: no rashes, warm and dry  NEURO: normal mentation, strength 5/5 on all 4 " extremities, no sensory deficits       LABS / IMAGING:   3/13/2024:  CMP and CBC WNL.    09/07/2023 CBC unremarkable, CMP unremarkable.  .  Lab work dated 05/31/2023:  CBC totally normal except for a mild elevation of the MCV.  CMP totally normal, including liver enzymes renal function, albumin 4.2 calcium normal and sore the electrolytes.   01/27/2023:  HCV nonreactive.  Hep B surface antigen nonreactive, hep B core antibody negative, HIV nonreactive, WBC 7.45, hemoglobin 13.5, platelets 270, creatinine 0.63, AST 17, ALT 11, alkaline phosphatase 63, bilirubin 0.4      Imaging  CT chest with contrast dated 3/6/2024:  No evidence of residual or recurrent tumor.  Subcarinal lymph node decreased in size, now normal.  Stable nonspecific left lower lobe pulmonary nodule    09/07/2023 PET-CT:  Status post right upper lobectomy with no PET evidence of recurrent disease.  Tiny left lower lobe nodule can be followed on surveillance scans.    CT of the chest abdomen and pelvis dated 06/01/2023, there are no prior images for comparison.  There is evidence of a right upper lobectomy, with scar in the right suprahilar region noted.  No pulmonary nodules or consolidation or pleural effusions are identified.  There is a mediastinal node in the subcarinal space   measuring 16 x 11 mm .  There are no findings to suggest recurrence in the CT of the abdomen and pelvis.  There is cholelithiasis without cholecystitis by CT.                                 9/15/2022 PET: Right upper lung lobe mass opacity with extensive spiculations and avid hypermetabolism (measures 2.0 x 4.2 cm) is of high concern for primary lung carcinoma.  This extends further in the apical portion with thick reticulations reaching the medial and lateral pleural surface on image 61 series 3 and also inferiorly on image 68 series 3. No metastatic findings.       PATHOLOGY:      - 11/2022: Lobectomy Right: 3.5 cm Invasive solid adenocarcinoma, G3, poorly  differentiated. No visceral Pleura Invasion or Lymphovascular Invasion & all margins negative for invasive carcinoma. Lymph Node 0/8   STAGE pT2a pN0   - NGS: , PD-L1 2%; MSI-stable, EGFR Exon 18 p.G719A amino acid mutation; BRAF Exon 11 p.G469V amino acid mutation. Variants of uncertain significance mutations in MET, STK11    Assessment:     NSCLC / poorly differentiated adenocarcinoma; Stage IB pT2a pN0 (0/8)   - NGS: , PD-L1 2%; MSI-stable, EGFR Exon 18 p.G719A amino acid mutation; BRAF Exon 11 p.G469V amino acid mutation. Variants of uncertain significance mutations in MET, STK11  - s/p RUL lobectomy 11/2022  - clinical trial STRATA [place] discussed and not recruiting now  - status post 4 cycles of adjuvant carboplatin plus pemetrexed  -PET-CT in September 2023 without evidence of disease recurrence/metastatic disease.  CT chest with contrast March 2024 without evidence of disease recurrence.    Plan:     3/14/2024:  Discussed with Dr. Cho right parietal headaches with sharp bursts of pain that started 3-4 months ago, 1-2 times a week.  Denies vision changes, weakness, and seizure activity.  No further workup at this time.  Patient advised to contact our office if headaches worsen, vision changes, or focal weakness.  Plan to follow-up in 6 months with repeat CBC, CMP, CT chest with contrast for surveillance for stage I disease.    The patient is in agreement with today's plan of care.  All questions answered.  Patient is aware to contact our office for any problems or concerns prior to next visit.      Sheela Rice, MSN-ED, APRN, AGACNP-BC, OCN

## 2024-05-31 ENCOUNTER — PATIENT MESSAGE (OUTPATIENT)
Dept: RESEARCH | Facility: HOSPITAL | Age: 64
End: 2024-05-31
Payer: MEDICARE

## 2024-06-17 PROBLEM — Z08 ENCOUNTER FOR FOLLOW-UP SURVEILLANCE OF LUNG CANCER: Status: RESOLVED | Noted: 2024-03-14 | Resolved: 2024-06-17

## 2024-06-17 PROBLEM — Z85.118 ENCOUNTER FOR FOLLOW-UP SURVEILLANCE OF LUNG CANCER: Status: RESOLVED | Noted: 2024-03-14 | Resolved: 2024-06-17

## 2024-09-17 NOTE — PROGRESS NOTES
Previous oncologist: Dr. Arias, Dr. Hewitt and Dr. Bond.       Diagnosis:  Right Lung adenocarcinoma, poorly differentiated, Stage IB pT2a pN0 (0/8)       Treatment history   23-23:  Adjuvant carboplatin pemetrexed x4 cycles    HPI:  62-year-old  female found to have on routine CT screen to have mass involving right upper lung field proven to be bronchogenic carcinoma, adenocarcinoma subtype.  Staging workup revealed no evidence of extrathoracic disease.  Patient underwent thoracotomy and right upper lobectomy on 2022.  And final path revealed a 3.5 x 2.5 x 1.2 cm adenocarcinoma that was poorly differentiated.  There was no evidence of lymph node involvement.  Margins were free of residual disease.  Final stage was 1B.  Patient referred to our service for recommendations regarding possible adjuvant treatment.      Interval history     2024:  Ms. Hanson is here today for her 6-month follow-up, labs, and CT scan for her stage IB right lung cancer.  Today, she reports, feeling well.  She does report right parietal headaches with sharp bursts of pain that started before last appt, they have since increased in frequency. She is also having dizziness, syncope, and memory loss. Will order MRI of the brain with and without contrast today for completeness. If this is negative can refer to neurology. She denies any fatigue, hemoptysis, SOB, chest pain, abnormal bleeding, abdominal pain, joint or bone pain, no lower extremity edema, and no peripheral neuropathy.     Eating and drinking.  Weight is stable,  No recent hospitalizations, infections, or illnesses.         Past Medical History:   Diagnosis Date    Allergy     Cholelithiases     Lung cancer     Lung mass     Macular degeneration        Past Surgical History:   Procedure Laterality Date     SECTION N/A     LUNG LOBECTOMY  2022    Procedure: LOBECTOMY, LUNG;  Surgeon: Topher Gaston IV, MD;  Location: [facility];  Service:  Cardiothoracic;;    NAVIGATIONAL BRONCHOSCOPY N/A 2022    Procedure: BRONCHOSCOPY, NAVIGATIONAL;  Surgeon: Brett Rosario MD;  Location: [facility] BRONCHOSCOPY LAB;  Service: Pulmonary;  Laterality: N/A;    TUBAL LIGATION         Family History   Problem Relation Name Age of Onset    Heart defect Father      Diabetes Maternal Aunt Jenn Dee     Diabetes Maternal Grandmother Jena Schafer     Vision loss Maternal Grandmother Jena Schafer     Heart disease Paternal Grandfather Alen Schafer        Social History     Socioeconomic History    Marital status:    Tobacco Use    Smoking status: Former     Current packs/day: 0.00     Average packs/day: 0.3 packs/day for 35.0 years (8.8 ttl pk-yrs)     Types: Cigarettes     Start date: 1987     Quit date: 2022     Years since quittin.8    Smokeless tobacco: Never    Tobacco comments:     Now smoking 1-3 cigarettes per day   Substance and Sexual Activity    Alcohol use: Yes     Alcohol/week: 6.0 standard drinks of alcohol     Types: 6 Cans of beer per week     Comment: occassionally    Drug use: Never    Sexual activity: Not Currently     Partners: Male     Birth control/protection: Post-menopausal       Current Outpatient Medications   Medication Sig Dispense Refill    acetaminophen (TYLENOL) 325 MG tablet Take 325 mg by mouth every 6 (six) hours as needed for Pain.      b complex vitamins tablet Take 1 tablet by mouth once daily.      echinacea purpurea extract (ECHINACEA) 125 mg Tab Take 1 tablet by mouth once daily.      famotidine (PEPCID) 20 MG tablet Take 20 mg by mouth once daily.      folic acid (FOLVITE) 1 MG tablet Take 1 tablet by mouth once daily. 30 tablet 0    loratadine (CLARITIN) 10 mg tablet Take 10 mg by mouth once daily.      vitamin D (VITAMIN D3) 1000 units Tab Take 1,000 Units by mouth once daily.       No current facility-administered medications for this visit.       Review of patient's allergies indicates:  "  Allergen Reactions    Chlorine     Monosodium glutamate Other (See Comments) and Swelling    Msg [glutamic acid] Other (See Comments) and Edema     Migraine headache    Amoxicillin     Penicillin     Penicillins Rash     Review of systems  CONSTITUTIONAL: no fevers, no chills, no weight loss, no fatigue, no weakness  HEMATOLOGIC: no abnormal bleeding, no abnormal bruising, no drenching night sweats  ONCOLOGIC: no new masses or lumps  HEENT: no vision loss, no tinnitus or hearing loss, no nose bleeding, no dysphagia, no odynophagia  CVS: no chest pain, no palpitations, no dyspnea on exertion  RESP: no shortness of breath, no hemoptysis, no cough  GI: no nausea, no vomiting, no diarrhea, no constipation, no melena, no hematochezia, no hematemesis, no abdominal pain, no increase in abdominal girth  : no dysuria, no hematuria, no hesitancy, no scrotal swelling, no discharge  INTEGUMENT: no rashes, no abnormal bruising, no nail pitting, no hyperpigmentation  NEURO: no falls, no memory loss, no paresthesias or dysesthesias, no urofecal incontinence or retention, no loss of strength on any extremity  MSK: no back pain, no new joint pain, no joint swelling  PSYCH: no suicidal or homicidal ideation, no depression, no insomnia, no anhedonia  ENDOCRINE: no heat or cold intolerance, no polyuria, no polydipsia         Blood pressure 129/75, pulse 96, temperature 98.1 °F (36.7 °C), temperature source Oral, resp. rate 18, height 5' 5" (1.651 m), weight 59.2 kg (130 lb 9.6 oz), SpO2 97%.     GA: AAOx3, NAD  HEENT: NCAT, PERRLA, EOMI, good dentition, moist oral mucous membranes  LYMPH: no cervical, axillary or supraclavicular adenopathy  CVS: s1s2 RRR, no M/R/G  RESP: CTA b/l, no crackles, no wheezes or rhonchi  ABD: soft, NT, ND, BS+, no hepatosplenomegaly  EXT: no deformities, no pedal edema  SKIN: no rashes, warm and dry  NEURO: normal mentation, strength 5/5 on all 4 extremities, no sensory deficits       LABS / IMAGING: "   9/12/2024:  CMP and CBC WNL.    3/13/2024:  CMP and CBC WNL.    09/07/2023 CBC unremarkable, CMP unremarkable.  .  Lab work dated 05/31/2023:  CBC totally normal except for a mild elevation of the MCV.  CMP totally normal, including liver enzymes renal function, albumin 4.2 calcium normal and sore the electrolytes.   01/27/2023:  HCV nonreactive.  Hep B surface antigen nonreactive, hep B core antibody negative, HIV nonreactive, WBC 7.45, hemoglobin 13.5, platelets 270, creatinine 0.63, AST 17, ALT 11, alkaline phosphatase 63, bilirubin 0.4      Imaging  9/16/2024 CT chest with contrast: No evidence of residual or recurrent tumor.  Subcarinal lymph node stable. Stable nonspecific left lower lobe pulmonary nodule. Small nodule involving the left lobe of the liver.    CT chest with contrast dated 3/6/2024:  No evidence of residual or recurrent tumor.  Subcarinal lymph node decreased in size, now normal.  Stable nonspecific left lower lobe pulmonary nodule    09/07/2023 PET-CT:  Status post right upper lobectomy with no PET evidence of recurrent disease.  Tiny left lower lobe nodule can be followed on surveillance scans.    CT of the chest abdomen and pelvis dated 06/01/2023, there are no prior images for comparison.  There is evidence of a right upper lobectomy, with scar in the right suprahilar region noted.  No pulmonary nodules or consolidation or pleural effusions are identified.  There is a mediastinal node in the subcarinal space   measuring 16 x 11 mm .  There are no findings to suggest recurrence in the CT of the abdomen and pelvis.  There is cholelithiasis without cholecystitis by CT.                                 9/15/2022 PET: Right upper lung lobe mass opacity with extensive spiculations and avid hypermetabolism (measures 2.0 x 4.2 cm) is of high concern for primary lung carcinoma.  This extends further in the apical portion with thick reticulations reaching the medial and lateral pleural surface  on image 61 series 3 and also inferiorly on image 68 series 3. No metastatic findings.       PATHOLOGY:      - 11/2022: Lobectomy Right: 3.5 cm Invasive solid adenocarcinoma, G3, poorly differentiated. No visceral Pleura Invasion or Lymphovascular Invasion & all margins negative for invasive carcinoma. Lymph Node 0/8   STAGE pT2a pN0   - NGS: , PD-L1 2%; MSI-stable, EGFR Exon 18 p.G719A amino acid mutation; BRAF Exon 11 p.G469V amino acid mutation. Variants of uncertain significance mutations in MET, STK11    Assessment:     NSCLC / poorly differentiated adenocarcinoma; Stage IB pT2a pN0 (0/8)   - NGS: , PD-L1 2%; MSI-stable, EGFR Exon 18 p.G719A amino acid mutation; BRAF Exon 11 p.G469V amino acid mutation. Variants of uncertain significance mutations in MET, STK11  - s/p RUL lobectomy 11/2022  - clinical trial STRATA [place] discussed and not recruiting now  - status post 4 cycles of adjuvant carboplatin plus pemetrexed  -PET-CT in September 2023 without evidence of disease recurrence/metastatic disease.  CT chest with contrast March 2024 without evidence of disease recurrence.    Plan:   9/18/24  MRI of the Brain with and without contrast for headaches increasing in frequency along with dizziness, syncope, and memory loss.  Follow up in clinic 2-3 weeks for results of MRI    Plan to follow-up in 6 months with repeat CBC, CMP, CT chest with contrast for surveillance for stage I disease.    Kajal BURNHAMP-C

## 2024-09-18 ENCOUNTER — OFFICE VISIT (OUTPATIENT)
Dept: HEMATOLOGY/ONCOLOGY | Facility: CLINIC | Age: 64
End: 2024-09-18
Payer: MEDICARE

## 2024-09-18 VITALS
OXYGEN SATURATION: 97 % | HEIGHT: 65 IN | HEART RATE: 96 BPM | BODY MASS INDEX: 21.76 KG/M2 | WEIGHT: 130.63 LBS | SYSTOLIC BLOOD PRESSURE: 129 MMHG | TEMPERATURE: 98 F | DIASTOLIC BLOOD PRESSURE: 75 MMHG | RESPIRATION RATE: 18 BRPM

## 2024-09-18 DIAGNOSIS — R59.0 MEDIASTINAL ADENOPATHY: ICD-10-CM

## 2024-09-18 DIAGNOSIS — R41.3 OTHER AMNESIA: ICD-10-CM

## 2024-09-18 DIAGNOSIS — R42 DIZZINESS AND GIDDINESS: ICD-10-CM

## 2024-09-18 DIAGNOSIS — R55 SYNCOPE AND COLLAPSE: ICD-10-CM

## 2024-09-18 DIAGNOSIS — Z08 ENCOUNTER FOR FOLLOW-UP SURVEILLANCE OF LUNG CANCER: ICD-10-CM

## 2024-09-18 DIAGNOSIS — C34.91 NSCLC OF RIGHT LUNG: ICD-10-CM

## 2024-09-18 DIAGNOSIS — C34.11 MALIGNANT NEOPLASM OF UPPER LOBE OF RIGHT LUNG: Primary | ICD-10-CM

## 2024-09-18 DIAGNOSIS — C34.90 MALIGNANT NEOPLASM OF UNSPECIFIED PART OF UNSPECIFIED BRONCHUS OR LUNG: ICD-10-CM

## 2024-09-18 DIAGNOSIS — Z85.118 ENCOUNTER FOR FOLLOW-UP SURVEILLANCE OF LUNG CANCER: ICD-10-CM

## 2024-09-18 PROCEDURE — 1160F RVW MEDS BY RX/DR IN RCRD: CPT | Mod: CPTII,,,

## 2024-09-18 PROCEDURE — 1159F MED LIST DOCD IN RCRD: CPT | Mod: CPTII,,,

## 2024-09-18 PROCEDURE — 3288F FALL RISK ASSESSMENT DOCD: CPT | Mod: CPTII,,,

## 2024-09-18 PROCEDURE — 99215 OFFICE O/P EST HI 40 MIN: CPT | Mod: ,,,

## 2024-09-18 PROCEDURE — 1101F PT FALLS ASSESS-DOCD LE1/YR: CPT | Mod: CPTII,,,

## 2024-09-18 PROCEDURE — 3008F BODY MASS INDEX DOCD: CPT | Mod: CPTII,,,

## 2024-09-18 PROCEDURE — 3074F SYST BP LT 130 MM HG: CPT | Mod: CPTII,,,

## 2024-09-18 PROCEDURE — 3078F DIAST BP <80 MM HG: CPT | Mod: CPTII,,,

## 2024-09-19 DIAGNOSIS — C34.11 MALIGNANT NEOPLASM OF UPPER LOBE OF RIGHT LUNG: Primary | ICD-10-CM

## 2024-10-04 ENCOUNTER — OFFICE VISIT (OUTPATIENT)
Dept: HEMATOLOGY/ONCOLOGY | Facility: CLINIC | Age: 64
End: 2024-10-04
Payer: MEDICARE

## 2024-10-04 VITALS — HEIGHT: 65 IN | BODY MASS INDEX: 21.66 KG/M2 | WEIGHT: 130 LBS

## 2024-10-04 DIAGNOSIS — C34.91 NSCLC OF RIGHT LUNG: ICD-10-CM

## 2024-10-04 DIAGNOSIS — C34.11 MALIGNANT NEOPLASM OF UPPER LOBE OF RIGHT LUNG: Primary | ICD-10-CM

## 2024-10-04 DIAGNOSIS — Z08 ENCOUNTER FOR FOLLOW-UP SURVEILLANCE OF LUNG CANCER: ICD-10-CM

## 2024-10-04 DIAGNOSIS — Z85.118 ENCOUNTER FOR FOLLOW-UP SURVEILLANCE OF LUNG CANCER: ICD-10-CM

## 2024-10-04 NOTE — PROGRESS NOTES
Established Patient - Audio Only Telehealth Visit     The patient location is: home  The chief complaint leading to consultation is: Lung cancer  Visit type: Virtual visit with audio only (telephone)  Total time spent with patient: 25       The reason for the audio only service rather than synchronous audio and video virtual visit was related to technical difficulties or patient preference/necessity.     Each patient to whom I provide medical services by telemedicine is:  (1) informed of the relationship between the physician and patient and the respective role of any other health care provider with respect to management of the patient; and (2) notified that they may decline to receive medical services by telemedicine and may withdraw from such care at any time. Patient verbally consented to receive this service via voice-only telephone call.    Previous oncologist: Dr. Arias, Dr. Hewitt and Dr. Bond.       Diagnosis:  Right Lung adenocarcinoma, poorly differentiated, Stage IB pT2a pN0 (0/8)       Treatment history   2/1/23-4/5/23:  Adjuvant carboplatin pemetrexed x4 cycles    HPI:  62-year-old  female found to have on routine CT screen to have mass involving right upper lung field proven to be bronchogenic carcinoma, adenocarcinoma subtype.  Staging workup revealed no evidence of extrathoracic disease.  Patient underwent thoracotomy and right upper lobectomy on 11/09/2022.  And final path revealed a 3.5 x 2.5 x 1.2 cm adenocarcinoma that was poorly differentiated.  There was no evidence of lymph node involvement.  Margins were free of residual disease.  Final stage was 1B.  Patient referred to our service for recommendations regarding possible adjuvant treatment.      Interval history     10/4/24:   is by phone today to go over results of brain MRI that showed no acute intercranial abnormalities after having right parietal headaches with sharp bursts of pain that started before last appt, that  had increased in frequency. She was also having dizziness, syncope, and memory loss. She denies any fatigue, hemoptysis, SOB, chest pain, abnormal bleeding, abdominal pain, joint or bone pain, no lower extremity edema, and no peripheral neuropathy.     Eating and drinking.  Weight is stable,  No recent hospitalizations, infections, or illnesses.         Past Medical History:   Diagnosis Date    Allergy     Cholelithiases     Lung cancer     Lung mass     Macular degeneration        Past Surgical History:   Procedure Laterality Date     SECTION N/A     LUNG LOBECTOMY  2022    Procedure: LOBECTOMY, LUNG;  Surgeon: Topher Gaston IV, MD;  [facility];  Service: Cardiothoracic;;    NAVIGATIONAL BRONCHOSCOPY N/A 2022    Procedure: BRONCHOSCOPY, NAVIGATIONAL;  Surgeon: Brett Rosario MD;  Location: [facility] BRONCHOSCOPY LAB;  Service: Pulmonary;  Laterality: N/A;    TUBAL LIGATION         Family History   Problem Relation Name Age of Onset    Heart defect Father      Diabetes Maternal Aunt Jenn Dee     Diabetes Maternal Grandmother Jena Schafer     Vision loss Maternal Grandmother Jean Schafer     Heart disease Paternal Grandfather Alen Schafer        Social History     Socioeconomic History    Marital status:    Tobacco Use    Smoking status: Former     Current packs/day: 0.00     Average packs/day: 0.3 packs/day for 35.0 years (8.8 ttl pk-yrs)     Types: Cigarettes     Start date: 1987     Quit date: 2022     Years since quittin.9    Smokeless tobacco: Never    Tobacco comments:     Now smoking 1-3 cigarettes per day   Substance and Sexual Activity    Alcohol use: Yes     Alcohol/week: 6.0 standard drinks of alcohol     Types: 6 Cans of beer per week     Comment: occassionally    Drug use: Never    Sexual activity: Not Currently     Partners: Male     Birth control/protection: Post-menopausal       Current Outpatient Medications   Medication Sig Dispense  Refill    acetaminophen (TYLENOL) 325 MG tablet Take 325 mg by mouth every 6 (six) hours as needed for Pain.      b complex vitamins tablet Take 1 tablet by mouth once daily.      echinacea purpurea extract (ECHINACEA) 125 mg Tab Take 1 tablet by mouth once daily.      famotidine (PEPCID) 20 MG tablet Take 20 mg by mouth once daily.      folic acid (FOLVITE) 1 MG tablet Take 1 tablet by mouth once daily. 30 tablet 0    loratadine (CLARITIN) 10 mg tablet Take 10 mg by mouth once daily.      vitamin D (VITAMIN D3) 1000 units Tab Take 1,000 Units by mouth once daily.       No current facility-administered medications for this visit.       Review of patient's allergies indicates:   Allergen Reactions    Chlorine     Monosodium glutamate Other (See Comments) and Swelling    Msg [glutamic acid] Other (See Comments) and Edema     Migraine headache    Amoxicillin     Penicillin     Penicillins Rash     Review of systems  CONSTITUTIONAL: no fevers, no chills, no weight loss, no fatigue, no weakness  HEMATOLOGIC: no abnormal bleeding, no abnormal bruising, no drenching night sweats  ONCOLOGIC: no new masses or lumps  HEENT: no vision loss, no tinnitus or hearing loss, no nose bleeding, no dysphagia, no odynophagia  CVS: no chest pain, no palpitations, no dyspnea on exertion  RESP: no shortness of breath, no hemoptysis, no cough  GI: no nausea, no vomiting, no diarrhea, no constipation, no melena, no hematochezia, no hematemesis, no abdominal pain, no increase in abdominal girth  : no dysuria, no hematuria, no hesitancy, no scrotal swelling, no discharge  INTEGUMENT: no rashes, no abnormal bruising, no nail pitting, no hyperpigmentation  NEURO: no falls, no memory loss, no paresthesias or dysesthesias, no urofecal incontinence or retention, no loss of strength on any extremity  MSK: no back pain, no new joint pain, no joint swelling  PSYCH: no suicidal or homicidal ideation, no depression, no insomnia, no  "anhedonia  ENDOCRINE: no heat or cold intolerance, no polyuria, no polydipsia         Height 5' 5" (1.651 m), weight 59 kg (130 lb).     GA: AAOx3, NAD  HEENT: NCAT, PERRLA, EOMI, good dentition, moist oral mucous membranes  LYMPH: no cervical, axillary or supraclavicular adenopathy  CVS: s1s2 RRR, no M/R/G  RESP: CTA b/l, no crackles, no wheezes or rhonchi  ABD: soft, NT, ND, BS+, no hepatosplenomegaly  EXT: no deformities, no pedal edema  SKIN: no rashes, warm and dry  NEURO: normal mentation, strength 5/5 on all 4 extremities, no sensory deficits       LABS / IMAGIN2024:  CMP and CBC WNL.    3/13/2024:  CMP and CBC WNL.    2023 CBC unremarkable, CMP unremarkable.  .  Lab work dated 2023:  CBC totally normal except for a mild elevation of the MCV.  CMP totally normal, including liver enzymes renal function, albumin 4.2 calcium normal and sore the electrolytes.   2023:  HCV nonreactive.  Hep B surface antigen nonreactive, hep B core antibody negative, HIV nonreactive, WBC 7.45, hemoglobin 13.5, platelets 270, creatinine 0.63, AST 17, ALT 11, alkaline phosphatase 63, bilirubin 0.4      Imaging  24 Brain MRI-   No acute intercranial abnormalities  2.   Paranasal and minimal mastoid disease  3.   Minimal nonspecific white matter changes likely age related    2024 CT chest with contrast: No evidence of residual or recurrent tumor.  Subcarinal lymph node stable. Stable nonspecific left lower lobe pulmonary nodule. Small nodule involving the left lobe of the liver.    CT chest with contrast dated 3/6/2024:  No evidence of residual or recurrent tumor.  Subcarinal lymph node decreased in size, now normal.  Stable nonspecific left lower lobe pulmonary nodule    2023 PET-CT:  Status post right upper lobectomy with no PET evidence of recurrent disease.  Tiny left lower lobe nodule can be followed on surveillance scans.    CT of the chest abdomen and pelvis dated 2023, " there are no prior images for comparison.  There is evidence of a right upper lobectomy, with scar in the right suprahilar region noted.  No pulmonary nodules or consolidation or pleural effusions are identified.  There is a mediastinal node in the subcarinal space   measuring 16 x 11 mm .  There are no findings to suggest recurrence in the CT of the abdomen and pelvis.  There is cholelithiasis without cholecystitis by CT.                                 9/15/2022 PET: Right upper lung lobe mass opacity with extensive spiculations and avid hypermetabolism (measures 2.0 x 4.2 cm) is of high concern for primary lung carcinoma.  This extends further in the apical portion with thick reticulations reaching the medial and lateral pleural surface on image 61 series 3 and also inferiorly on image 68 series 3. No metastatic findings.       PATHOLOGY:      - 11/2022: Lobectomy Right: 3.5 cm Invasive solid adenocarcinoma, G3, poorly differentiated. No visceral Pleura Invasion or Lymphovascular Invasion & all margins negative for invasive carcinoma. Lymph Node 0/8   STAGE pT2a pN0   - NGS: , PD-L1 2%; MSI-stable, EGFR Exon 18 p.G719A amino acid mutation; BRAF Exon 11 p.G469V amino acid mutation. Variants of uncertain significance mutations in MET, STK11    Assessment:     NSCLC / poorly differentiated adenocarcinoma; Stage IB pT2a pN0 (0/8)   - NGS: , PD-L1 2%; MSI-stable, EGFR Exon 18 p.G719A amino acid mutation; BRAF Exon 11 p.G469V amino acid mutation. Variants of uncertain significance mutations in MET, STK11  - s/p RUL lobectomy 11/2022  - clinical trial STRATA [place] discussed and not recruiting now  - status post 4 cycles of adjuvant carboplatin plus pemetrexed  -PET-CT in September 2023 without evidence of disease recurrence/metastatic disease.  CT chest with contrast March 2024 without evidence of disease recurrence.    Plan:   9/18/24  MRI of the Brain showed no acute abnormalities     Plan to follow-up as scheduled  (6 months) with repeat CBC, CMP, CT chest with contrast for surveillance for stage I disease.    Kajal Mock FNSRI-C    This service was not originating from a related E/M service provided within the previous 7 days nor will  to an E/M service or procedure within the next 24 hours or my soonest available appointment.  Prevailing standard of care was able to be met in this audio-only visit.

## 2025-03-18 ENCOUNTER — OFFICE VISIT (OUTPATIENT)
Dept: HEMATOLOGY/ONCOLOGY | Facility: CLINIC | Age: 65
End: 2025-03-18
Payer: MEDICARE

## 2025-03-18 VITALS
WEIGHT: 125.38 LBS | RESPIRATION RATE: 14 BRPM | OXYGEN SATURATION: 98 % | SYSTOLIC BLOOD PRESSURE: 125 MMHG | DIASTOLIC BLOOD PRESSURE: 82 MMHG | HEIGHT: 65 IN | BODY MASS INDEX: 20.89 KG/M2 | TEMPERATURE: 97 F | HEART RATE: 90 BPM

## 2025-03-18 DIAGNOSIS — Z85.118 HISTORY OF LUNG CANCER: Primary | ICD-10-CM

## 2025-03-18 DIAGNOSIS — Z85.118 ENCOUNTER FOR FOLLOW-UP SURVEILLANCE OF LUNG CANCER: ICD-10-CM

## 2025-03-18 DIAGNOSIS — Z08 ENCOUNTER FOR FOLLOW-UP SURVEILLANCE OF LUNG CANCER: ICD-10-CM

## 2025-03-18 NOTE — PROGRESS NOTES
Previous oncologist: Dr. Arias, Dr. Hewitt and Dr. Bond.       Diagnosis:  Right Lung adenocarcinoma, poorly differentiated, Stage IB pT2a pN0 (0/8)       Treatment history   23-23:  Adjuvant carboplatin pemetrexed x4 cycles    HPI:  64-year-old  female found to have on routine CT screen to have mass involving right upper lung field proven to be bronchogenic carcinoma, adenocarcinoma subtype.  Staging workup revealed no evidence of extrathoracic disease.  Patient underwent thoracotomy and right upper lobectomy on 2022.  And final path revealed a 3.5 x 2.5 x 1.2 cm adenocarcinoma that was poorly differentiated.  There was no evidence of lymph node involvement.  Margins were free of residual disease.  Final stage was 1B.  Patient referred to our service for recommendations regarding possible adjuvant treatment.      Interval history     Today, 2025, patient denies any acute concerns today.  She denies any symptoms of shortness of breath, chest pain, vision changes, headaches lightheadedness.  She denies any new medications, ER or hospital visits since last follow-up visit with us       Past Medical History:   Diagnosis Date    Allergy     Cholelithiases     Lung cancer     Lung mass     Macular degeneration        Past Surgical History:   Procedure Laterality Date     SECTION N/A     CHOLECYSTECTOMY      2024    LUNG LOBECTOMY  2022    Procedure: LOBECTOMY, LUNG;  Surgeon: Topher Gaston IV, MD;  Location: [facility] OR;  Service: Cardiothoracic;;    NAVIGATIONAL BRONCHOSCOPY N/A 2022    Procedure: BRONCHOSCOPY, NAVIGATIONAL;  Surgeon: Brett Rosario MD;  Location: [facility] BRONCHOSCOPY LAB;  Service: Pulmonary;  Laterality: N/A;    TUBAL LIGATION         Family History   Problem Relation Name Age of Onset    Heart defect Father      Diabetes Maternal Aunt Jenn Dee     Diabetes Maternal Grandmother Jena Schafer     Vision loss Maternal Grandmother Jena  Gilmar     Heart disease Paternal Grandfather Alen Schafer        Social History     Socioeconomic History    Marital status:    Tobacco Use    Smoking status: Every Day     Current packs/day: 0.00     Average packs/day: 0.3 packs/day for 35.0 years (8.8 ttl pk-yrs)     Types: Cigarettes     Start date: 1987     Last attempt to quit: 2022     Years since quittin.3    Smokeless tobacco: Never   Substance and Sexual Activity    Alcohol use: Yes     Alcohol/week: 6.0 standard drinks of alcohol     Types: 6 Cans of beer per week     Comment: occassionally    Drug use: Never    Sexual activity: Not Currently     Partners: Male     Birth control/protection: Post-menopausal     Social Drivers of Health     Financial Resource Strain: Low Risk  (2024)    Overall Financial Resource Strain (CARDIA)     Difficulty of Paying Living Expenses: Not hard at all   Food Insecurity: No Food Insecurity (2024)    Hunger Vital Sign     Worried About Running Out of Food in the Last Year: Never true     Ran Out of Food in the Last Year: Never true   Physical Activity: Insufficiently Active (2024)    Exercise Vital Sign     Days of Exercise per Week: 2 days     Minutes of Exercise per Session: 20 min   Stress: No Stress Concern Present (2024)    [employer] [place] [employer] - Occupational Stress Questionnaire     Feeling of Stress : Not at all   Housing Stability: Unknown (2024)    Housing Stability Vital Sign     Unable to Pay for Housing in the Last Year: No       Current Outpatient Medications   Medication Sig Dispense Refill    acetaminophen (TYLENOL) 325 MG tablet Take 325 mg by mouth every 6 (six) hours as needed for Pain.      b complex vitamins tablet Take 1 tablet by mouth once daily.      echinacea purpurea extract (ECHINACEA) 125 mg Tab Take 1 tablet by mouth once daily.      famotidine (PEPCID) 20 MG tablet Take 20 mg by mouth once daily.      loratadine (CLARITIN) 10  "mg tablet Take 10 mg by mouth once daily.      vitamin D (VITAMIN D3) 1000 units Tab Take 1,000 Units by mouth once daily.      folic acid (FOLVITE) 1 MG tablet Take 1 tablet by mouth once daily. (Patient not taking: Reported on 3/18/2025) 30 tablet 0     No current facility-administered medications for this visit.       Review of patient's allergies indicates:   Allergen Reactions    Chlorine     Monosodium glutamate Other (See Comments) and Swelling    Msg [glutamic acid] Other (See Comments) and Edema     Migraine headache    Amoxicillin     Penicillin     Penicillins Rash     Review of systems  CONSTITUTIONAL: no fevers, no chills, no weight loss, no fatigue, no weakness  HEMATOLOGIC: no abnormal bleeding, no abnormal bruising, no drenching night sweats  ONCOLOGIC: no new masses or lumps  HEENT: no vision loss, no tinnitus or hearing loss, no nose bleeding, no dysphagia, no odynophagia  CVS: no chest pain, no palpitations, no dyspnea on exertion  RESP: no shortness of breath, no hemoptysis, no cough  GI: no nausea, no vomiting, no diarrhea, no constipation, no melena, no hematochezia, no hematemesis, no abdominal pain, no increase in abdominal girth  : no dysuria, no hematuria, no hesitancy, no scrotal swelling, no discharge  INTEGUMENT: no rashes, no abnormal bruising, no nail pitting, no hyperpigmentation  NEURO: no falls, no memory loss, no paresthesias or dysesthesias, no urofecal incontinence or retention, no loss of strength on any extremity  MSK: no back pain, no new joint pain, no joint swelling  PSYCH: no suicidal or homicidal ideation, no depression, no insomnia, no anhedonia  ENDOCRINE: no heat or cold intolerance, no polyuria, no polydipsia    Physical exam       Blood pressure 125/82, pulse 90, temperature 97.4 °F (36.3 °C), temperature source Oral, resp. rate 14, height 5' 5" (1.651 m), weight 56.9 kg (125 lb 6.4 oz), SpO2 98%.       GA: AAOx3, NAD  HEENT:  moist oral mucous membranes  RESP:  " Equal chest rise, no accessory muscle use  EXT: no deformities, no pedal edema  SKIN: no rashes, warm and dry  NEURO: normal mentation, no gross neuro deficits           LABS / [id number] CMP unremarkable, CBC unremarkable  2024:  CMP and CBC WNL.    3/13/2024:  CMP and CBC WNL.    2023 CBC unremarkable, CMP unremarkable.  .  Lab work dated 2023:  CBC totally normal except for a mild elevation of the MCV.  CMP totally normal, including liver enzymes renal function, albumin 4.2 calcium normal and sore the electrolytes.   2023:  HCV nonreactive.  Hep B surface antigen nonreactive, hep B core antibody negative, HIV nonreactive, WBC 7.45, hemoglobin 13.5, platelets 270, creatinine 0.63, AST 17, ALT 11, alkaline phosphatase 63, bilirubin 0.4      Imaging    03/10/2025 CT chest with contrast no significant change, no evidence of residual or recurrent tumor with stable postsurgical changes in the right upper lung medially and associated atelectasis of the findings as noted above    24 Brain MRI-   No acute intercranial abnormalities  2.   Paranasal and minimal mastoid disease  3.   Minimal nonspecific white matter changes likely age related    2024 CT chest with contrast: No evidence of residual or recurrent tumor.  Subcarinal lymph node stable. Stable nonspecific left lower lobe pulmonary nodule. Small nodule involving the left lobe of the liver.    CT chest with contrast dated 3/6/2024:  No evidence of residual or recurrent tumor.  Subcarinal lymph node decreased in size, now normal.  Stable nonspecific left lower lobe pulmonary nodule    2023 PET-CT:  Status post right upper lobectomy with no PET evidence of recurrent disease.  Tiny left lower lobe nodule can be followed on surveillance scans.    CT of the chest abdomen and pelvis dated 2023, there are no prior images for comparison.  There is evidence of a right upper lobectomy, with scar in the right  suprahilar region noted.  No pulmonary nodules or consolidation or pleural effusions are identified.  There is a mediastinal node in the subcarinal space   measuring 16 x 11 mm .  There are no findings to suggest recurrence in the CT of the abdomen and pelvis.  There is cholelithiasis without cholecystitis by CT.                                 9/15/2022 PET: Right upper lung lobe mass opacity with extensive spiculations and avid hypermetabolism (measures 2.0 x 4.2 cm) is of high concern for primary lung carcinoma.  This extends further in the apical portion with thick reticulations reaching the medial and lateral pleural surface on image 61 series 3 and also inferiorly on image 68 series 3. No metastatic findings.       PATHOLOGY:      - 11/2022: Lobectomy Right: 3.5 cm Invasive solid adenocarcinoma, G3, poorly differentiated. No visceral Pleura Invasion or Lymphovascular Invasion & all margins negative for invasive carcinoma. Lymph Node 0/8   STAGE pT2a pN0   - NGS: , PD-L1 2%; MSI-stable, EGFR Exon 18 p.G719A amino acid mutation; BRAF Exon 11 p.G469V amino acid mutation. Variants of uncertain significance mutations in MET, STK11    Assessment:     NSCLC / poorly differentiated adenocarcinoma; Stage IB pT2a pN0 (0/8)   - NGS: , PD-L1 2%; MSI-stable, EGFR Exon 18 p.G719A amino acid mutation; BRAF Exon 11 p.G469V amino acid mutation. Variants of uncertain significance mutations in MET, STK11  - s/p RUL lobectomy 11/2022  - status post 4 cycles of adjuvant carboplatin plus pemetrexed  -PET-CT in September 2023 without evidence of disease recurrence/metastatic disease.  CT chest with contrast March 2024 without evidence of disease recurrence.  CT chest with contrast in March 2025 with no evidence of disease    Plan:     This is a plan continue surveillance, follow-up in 6 months with labs and CT chest with contrast prior.          Portions of the record may have been created with voice recognition software. Occasional  wrong-word or sound-a-like substitutions may have occurred due to the inherent limitations of voice recognition software.

## 2025-08-26 DIAGNOSIS — C34.11 MALIGNANT NEOPLASM OF UPPER LOBE OF RIGHT LUNG: Primary | ICD-10-CM

## 2025-08-26 DIAGNOSIS — C34.90 MALIGNANT NEOPLASM OF UNSPECIFIED PART OF UNSPECIFIED BRONCHUS OR LUNG: ICD-10-CM

## (undated) DEVICE — GAUZE SPONGE 4X4 12PLY

## (undated) DEVICE — BOWL STERILE LARGE 32OZ

## (undated) DEVICE — TUBING AIRLIFE O2 VINYL 7FT

## (undated) DEVICE — TUBE SUCTION MEDI-VAC STERILE

## (undated) DEVICE — SUT VICRYL #2 TP-1 2-27IN

## (undated) DEVICE — FILTER BACTERIA 001851

## (undated) DEVICE — GOWN ISOLATION OTH SMS YLW LG

## (undated) DEVICE — CONNECTOR TUBING OXYGEN

## (undated) DEVICE — ELECTRODE BLD EXT 6.50 ST DISP

## (undated) DEVICE — TRAY CATH FOL SIL URIMTR 16FR

## (undated) DEVICE — GLOVE PROTEXIS PI SYN SURG 7.5

## (undated) DEVICE — ADAPTER NUT NIPPLE

## (undated) DEVICE — ATOMIZER NASAL DRUG DEL NO SYR

## (undated) DEVICE — CLIP HORIZON LIG TI MED-LG

## (undated) DEVICE — GLOVE PROTEXIS LTX MICRO  7

## (undated) DEVICE — KIT CANIST SUCTION 1200CC

## (undated) DEVICE — DRAPE FLUID WARMER 44X44IN

## (undated) DEVICE — ENDOAPTH VAS RELOAD WHITE 2.5

## (undated) DEVICE — ELECTRODE RED DOT EKG

## (undated) DEVICE — TUBING SUC MEDI-VAC CONN 6FT

## (undated) DEVICE — SYR ONLY LUER LOCK 20CC

## (undated) DEVICE — VALVE OLYMPUS SCOPE SUCTION

## (undated) DEVICE — Device

## (undated) DEVICE — CUSHION  WC FOAM 20X20X.75IN

## (undated) DEVICE — CLIP LIGATING MEDIUM

## (undated) DEVICE — SYR SLIP TIP 10ML SHIELD

## (undated) DEVICE — SYR ONLY LEUR TIP 30CC

## (undated) DEVICE — CATH THOR SIL STR 6 EYELT 28FR

## (undated) DEVICE — BRUSH CLEANING EBUS SMALL

## (undated) DEVICE — DRAIN CHEST DRY SUCTION

## (undated) DEVICE — STAPLER ECHELON FLEX GST 45MM

## (undated) DEVICE — MASK SURG LVL 3 EYESHIELD LOOP

## (undated) DEVICE — RELOAD ECHELON ENDOPATH 45MM

## (undated) DEVICE — SUT SILK 0 STRANDS 30IN BLK

## (undated) DEVICE — SUT VICRYL 3-0 8-18 PS-1

## (undated) DEVICE — NEBULIZER INTIN UP-DRAFT II NE

## (undated) DEVICE — WRAP STERILIZATION 45X45 DISP

## (undated) DEVICE — GLOVE PROTEXIS HYDROGEL SZ6.5

## (undated) DEVICE — KIT CLN RIVITAL-OX ENDOSCP

## (undated) DEVICE — CLIP LIGATING HEMOCLP SMALL

## (undated) DEVICE — LIGATING CLIP LARGE

## (undated) DEVICE — GLOVE PROTEXIS LTX MICRO 8

## (undated) DEVICE — BLADE SURG STAINLESS STEEL #10

## (undated) DEVICE — ADAPTER SWIVEL

## (undated) DEVICE — BRUSH BIOPSY SUPER D

## (undated) DEVICE — STAPLER RELOADABLE LINEAR 30M

## (undated) DEVICE — FORCEP BIOPSY SUPER D

## (undated) DEVICE — SOL IRR SOD CHL .9% POUR

## (undated) DEVICE — DRAPE STERI INCISE 23X23IN

## (undated) DEVICE — SPONGE LAP STRL 18X18IN

## (undated) DEVICE — SUT VICRYL 1 OB 36 CTX

## (undated) DEVICE — SUT ETHBND XTRA 1 OS-8 30IN

## (undated) DEVICE — KIT ILLUMISITE NAVGTN CATH 90D

## (undated) DEVICE — ECHELON FLEX POWERED VAS STPLR

## (undated) DEVICE — CONTAINER SPECIMEN SCREW 4OZ

## (undated) DEVICE — SET LEADWIRE PINCH 3 LD 50IN

## (undated) DEVICE — TIP SUCTION YANKAUER

## (undated) DEVICE — SOL IRRI STRL WATER 1000ML

## (undated) DEVICE — PATCH SUPER D PATIENT SENSOR

## (undated) DEVICE — SUT VICRYL 2-0 36 CT-1

## (undated) DEVICE — DRESSING ABSRBNT ISLAND 3.6X8

## (undated) DEVICE — GLOVE PROTEXIS BLUE LATEX 7

## (undated) DEVICE — GLOVE PROTEXIS NEOPRENE 7.5

## (undated) DEVICE — ELECTRODE PATIENT RETURN DISP